# Patient Record
Sex: MALE | Race: WHITE | NOT HISPANIC OR LATINO | Employment: OTHER | URBAN - NONMETROPOLITAN AREA
[De-identification: names, ages, dates, MRNs, and addresses within clinical notes are randomized per-mention and may not be internally consistent; named-entity substitution may affect disease eponyms.]

---

## 2017-07-05 DIAGNOSIS — I11.9 BENIGN HYPERTENSIVE HEART DISEASE WITHOUT HEART FAILURE: ICD-10-CM

## 2017-07-05 DIAGNOSIS — E78.2 MIXED HYPERLIPIDEMIA: ICD-10-CM

## 2017-07-05 DIAGNOSIS — I10 ESSENTIAL HYPERTENSION, BENIGN: ICD-10-CM

## 2017-07-10 ENCOUNTER — HOSPITAL ENCOUNTER (OUTPATIENT)
Dept: LAB | Facility: MEDICAL CENTER | Age: 67
End: 2017-07-10
Attending: INTERNAL MEDICINE
Payer: MEDICARE

## 2017-07-10 ENCOUNTER — OFFICE VISIT (OUTPATIENT)
Dept: CARDIOLOGY | Facility: PHYSICIAN GROUP | Age: 67
End: 2017-07-10
Payer: MEDICARE

## 2017-07-10 VITALS
SYSTOLIC BLOOD PRESSURE: 112 MMHG | BODY MASS INDEX: 32.93 KG/M2 | DIASTOLIC BLOOD PRESSURE: 80 MMHG | OXYGEN SATURATION: 96 % | HEART RATE: 69 BPM | HEIGHT: 70 IN | WEIGHT: 230 LBS

## 2017-07-10 DIAGNOSIS — E78.2 MIXED HYPERLIPIDEMIA: ICD-10-CM

## 2017-07-10 DIAGNOSIS — I11.9 BENIGN HYPERTENSIVE HEART DISEASE WITHOUT HEART FAILURE: ICD-10-CM

## 2017-07-10 DIAGNOSIS — Z95.5 PRESENCE OF STENT IN LAD CORONARY ARTERY: ICD-10-CM

## 2017-07-10 DIAGNOSIS — I25.10 ATHEROSCLEROSIS OF NATIVE CORONARY ARTERY OF NATIVE HEART WITHOUT ANGINA PECTORIS: ICD-10-CM

## 2017-07-10 DIAGNOSIS — I10 ESSENTIAL HYPERTENSION, BENIGN: ICD-10-CM

## 2017-07-10 DIAGNOSIS — I25.2 OLD MYOCARDIAL INFARCTION: ICD-10-CM

## 2017-07-10 DIAGNOSIS — I50.42 CHRONIC COMBINED SYSTOLIC AND DIASTOLIC HEART FAILURE (HCC): ICD-10-CM

## 2017-07-10 LAB
ALBUMIN SERPL BCP-MCNC: 4.2 G/DL (ref 3.2–4.9)
ALBUMIN/GLOB SERPL: 1.6 G/DL
ALP SERPL-CCNC: 55 U/L (ref 30–99)
ALT SERPL-CCNC: 28 U/L (ref 2–50)
ANION GAP SERPL CALC-SCNC: 8 MMOL/L (ref 0–11.9)
AST SERPL-CCNC: 23 U/L (ref 12–45)
BASOPHILS # BLD AUTO: 0.7 % (ref 0–1.8)
BASOPHILS # BLD: 0.04 K/UL (ref 0–0.12)
BILIRUB SERPL-MCNC: 1.7 MG/DL (ref 0.1–1.5)
BUN SERPL-MCNC: 16 MG/DL (ref 8–22)
CALCIUM SERPL-MCNC: 9.5 MG/DL (ref 8.5–10.5)
CHLORIDE SERPL-SCNC: 104 MMOL/L (ref 96–112)
CHOLEST SERPL-MCNC: 123 MG/DL (ref 100–199)
CO2 SERPL-SCNC: 28 MMOL/L (ref 20–33)
CREAT SERPL-MCNC: 1.01 MG/DL (ref 0.5–1.4)
EOSINOPHIL # BLD AUTO: 0.08 K/UL (ref 0–0.51)
EOSINOPHIL NFR BLD: 1.4 % (ref 0–6.9)
ERYTHROCYTE [DISTWIDTH] IN BLOOD BY AUTOMATED COUNT: 45.6 FL (ref 35.9–50)
GFR SERPL CREATININE-BSD FRML MDRD: >60 ML/MIN/1.73 M 2
GLOBULIN SER CALC-MCNC: 2.7 G/DL (ref 1.9–3.5)
GLUCOSE SERPL-MCNC: 87 MG/DL (ref 65–99)
HCT VFR BLD AUTO: 43.3 % (ref 42–52)
HDLC SERPL-MCNC: 38 MG/DL
HGB BLD-MCNC: 14.7 G/DL (ref 14–18)
IMM GRANULOCYTES # BLD AUTO: 0.01 K/UL (ref 0–0.11)
IMM GRANULOCYTES NFR BLD AUTO: 0.2 % (ref 0–0.9)
LDLC SERPL CALC-MCNC: 68 MG/DL
LYMPHOCYTES # BLD AUTO: 1.63 K/UL (ref 1–4.8)
LYMPHOCYTES NFR BLD: 29.3 % (ref 22–41)
MCH RBC QN AUTO: 33 PG (ref 27–33)
MCHC RBC AUTO-ENTMCNC: 33.9 G/DL (ref 33.7–35.3)
MCV RBC AUTO: 97.3 FL (ref 81.4–97.8)
MONOCYTES # BLD AUTO: 0.5 K/UL (ref 0–0.85)
MONOCYTES NFR BLD AUTO: 9 % (ref 0–13.4)
NEUTROPHILS # BLD AUTO: 3.3 K/UL (ref 1.82–7.42)
NEUTROPHILS NFR BLD: 59.4 % (ref 44–72)
NRBC # BLD AUTO: 0 K/UL
NRBC BLD AUTO-RTO: 0 /100 WBC
PLATELET # BLD AUTO: 190 K/UL (ref 164–446)
PMV BLD AUTO: 11.6 FL (ref 9–12.9)
POTASSIUM SERPL-SCNC: 4.1 MMOL/L (ref 3.6–5.5)
PROT SERPL-MCNC: 6.9 G/DL (ref 6–8.2)
RBC # BLD AUTO: 4.45 M/UL (ref 4.7–6.1)
SODIUM SERPL-SCNC: 140 MMOL/L (ref 135–145)
TRIGL SERPL-MCNC: 87 MG/DL (ref 0–149)
WBC # BLD AUTO: 5.6 K/UL (ref 4.8–10.8)

## 2017-07-10 PROCEDURE — 85025 COMPLETE CBC W/AUTO DIFF WBC: CPT

## 2017-07-10 PROCEDURE — 36415 COLL VENOUS BLD VENIPUNCTURE: CPT

## 2017-07-10 PROCEDURE — 99213 OFFICE O/P EST LOW 20 MIN: CPT | Performed by: INTERNAL MEDICINE

## 2017-07-10 PROCEDURE — 80061 LIPID PANEL: CPT

## 2017-07-10 PROCEDURE — 80053 COMPREHEN METABOLIC PANEL: CPT

## 2017-07-10 ASSESSMENT — ENCOUNTER SYMPTOMS
MYALGIAS: 0
COUGH: 0
BRUISES/BLEEDS EASILY: 0
FALLS: 0
PND: 0
WHEEZING: 0
ORTHOPNEA: 0

## 2017-07-10 NOTE — PROGRESS NOTES
Subjective:   Levi Aguilar is a 66 y.o. male who presents today for follow-up of coronary artery disease.  Cardiac status has been clinically stable since last clinic visit.  No chest pain, palpitations, orthopnea, edema, syncope, or near-syncope.  Activity capacity has been stable.  No interval change otherwise.    Past Medical History   Diagnosis Date   • Coronary atherosclerosis of native coronary artery    • Prior anterior myocardial infarction      3/31/11, Lytic therapy in [place] and PCI at the [facility].    • Presence of stent in LAD coronary artery      3/31/11 - 2.75 x 15 mm Promus in LAD, [facility], post lytic therapy of anterior MI.    • Benign hypertensive heart disease without heart failure      ( CHF was due to ischemic heart disease)    • Mixed hyperlipidemia      Limited tolerance of statin therapy due to myalgias during primary prevention efforts prior to his infarct but now tolerant of full dose of atorvastatin     Past Surgical History   Procedure Laterality Date   • Tonsillectomy     • Cardiac cath     • Angioplasty       Family History   Problem Relation Age of Onset   • Heart Disease Mother    • Heart Disease Father      History   Smoking status   • Former Smoker -- 1.50 packs/day   • Quit date: 01/01/1980   Smokeless tobacco   • Former User   • Types: Chew     No Known Allergies  Outpatient Encounter Prescriptions as of 7/10/2017   Medication Sig Dispense Refill   • Cholecalciferol (VITAMIN D PO) Take  by mouth.     • nitroglycerin (NITROSTAT) 0.4 MG SL Tab Place 1 Tab under tongue as needed for Chest Pain. 100 Tab 2   • spironolactone/hctz (ALDACTAZIDE) 25-25 MG Tab Take 0.5 Tabs by mouth every day. 45 Tab 3   • metoprolol SR (TOPROL XL) 25 MG TABLET SR 24 HR Take 1 Tab by mouth every day. 90 Tab 3   • atorvastatin (LIPITOR) 80 MG tablet Take 1 Tab by mouth every bedtime. 90 Tab 3   • lisinopril (PRINIVIL) 10 MG Tab Take 1 Tab by mouth every day. 90 Tab 3   • lorazepam (ATIVAN) 0.5 MG TABS Take 1  "Tab by mouth as needed for Anxiety. 20 Tab 0   • aspirin (ASA) 325 MG TABS Take 162.5 mg by mouth every day.     • Multiple Vitamin (MULTI-VITAMIN PO) Take 1 Tab by mouth every day.       • MAGNESIUM CITRATE Take  by mouth every day.     • Omega-3 Fatty Acids (FISH OIL PO) Take  by mouth. Take 2 capsules daily.        No facility-administered encounter medications on file as of 7/10/2017.     Review of Systems   HENT: Negative for nosebleeds.    Respiratory: Negative for cough and wheezing.    Cardiovascular: Negative for orthopnea and PND.   Musculoskeletal: Negative for myalgias and falls.   Endo/Heme/Allergies: Does not bruise/bleed easily.        Objective:   /80 mmHg  Pulse 69  Ht 1.778 m (5' 10\")  Wt 104.327 kg (230 lb)  BMI 33.00 kg/m2  SpO2 96%    Physical Exam   Constitutional: He is oriented to person, place, and time. He appears well-developed and well-nourished. No distress.   Eyes: Conjunctivae are normal. No scleral icterus.   Neck: No JVD present.   Cardiovascular: Normal rate, regular rhythm, S1 normal, S2 normal and intact distal pulses.  Exam reveals no gallop.    No murmur heard.  No carotid bruits   Pulmonary/Chest: Effort normal and breath sounds normal.   Musculoskeletal: He exhibits no edema.   Neurological: He is alert and oriented to person, place, and time.   Skin: Skin is warm and dry. He is not diaphoretic.   Psychiatric: He has a normal mood and affect. Thought content normal.       Assessment:     1. Atherosclerosis of native coronary artery of native heart without angina pectoris     2. Prior anterior myocardial infarction     3. Presence of stent in LAD coronary artery     4. Mixed hyperlipidemia     5. Essential hypertension, benign     6. Chronic combined systolic and diastolic heart failure (CMS-[facility])     Blood pressure is well-controlled. Coronary disease is asymptomatic. He has no clinical evidence of heart failure. His ejection fraction improved on medical therapy " substantially although he will be due for an echocardiogram in the future within the next year. Lipids are due for reassessment today and he just had his lab work drawn.    Medical Decision Making:  Today's Assessment / Status / Plan:     Continue the current cardiovascular regimen.   Call for lab results which should be available by tomorrow.  Cardiology follow up in 4 months and  sooner if needed for any change.  Set up echo after that visit.   Use of the emergency medical system reviewed for any symptoms because he has been asymptomatic at his current activity level..

## 2017-09-23 ENCOUNTER — TELEPHONE (OUTPATIENT)
Dept: CARDIOLOGY | Facility: MEDICAL CENTER | Age: 67
End: 2017-09-23

## 2017-09-24 NOTE — TELEPHONE ENCOUNTER
Please make sure he knows his labs were good. His my chart code  and so I don't think he ever got the message after I saw him in July because he did the lab the same day. I left him a phone message. He usually calls me back. In any case the labs were good.

## 2017-09-25 NOTE — TELEPHONE ENCOUNTER
Pt returning your call   Received: Today   Message Contents   Brandon Phillips R.N.   Phone Number: [phone]             [facility]/Roseline     Pt is returning your call, can be reached at [phone] until 2.30 pm.      ======================================================================================    Spoke with pt, discussed lab results per JW, pt verbalizes understanding

## 2017-11-10 DIAGNOSIS — I50.42 CHRONIC COMBINED SYSTOLIC AND DIASTOLIC HEART FAILURE (HCC): ICD-10-CM

## 2017-11-10 DIAGNOSIS — E78.2 MIXED HYPERLIPIDEMIA: ICD-10-CM

## 2017-11-10 DIAGNOSIS — I10 ESSENTIAL HYPERTENSION, BENIGN: ICD-10-CM

## 2017-11-16 ENCOUNTER — TELEPHONE (OUTPATIENT)
Dept: CARDIOLOGY | Facility: MEDICAL CENTER | Age: 67
End: 2017-11-16

## 2017-11-16 NOTE — TELEPHONE ENCOUNTER
----- Message from Karlee Cannon sent at 11/16/2017  2:01 PM PST -----  Regarding: Patient needs lab order put into [employer] huap  [facility]/No    Patient has an appt tomorrow to have his lab work done and needs a lab order put into [employer]. He wants a call back after 3:30pm today at [phone] to confirm that the order is in the computer.

## 2017-11-17 ENCOUNTER — HOSPITAL ENCOUNTER (OUTPATIENT)
Dept: LAB | Facility: MEDICAL CENTER | Age: 67
End: 2017-11-17
Attending: INTERNAL MEDICINE
Payer: MEDICARE

## 2017-11-17 ENCOUNTER — OFFICE VISIT (OUTPATIENT)
Dept: CARDIOLOGY | Facility: PHYSICIAN GROUP | Age: 67
End: 2017-11-17
Payer: MEDICARE

## 2017-11-17 VITALS
WEIGHT: 226 LBS | OXYGEN SATURATION: 95 % | SYSTOLIC BLOOD PRESSURE: 120 MMHG | BODY MASS INDEX: 32.35 KG/M2 | HEART RATE: 65 BPM | DIASTOLIC BLOOD PRESSURE: 78 MMHG | HEIGHT: 70 IN

## 2017-11-17 DIAGNOSIS — I25.10 ATHEROSCLEROSIS OF NATIVE CORONARY ARTERY OF NATIVE HEART WITHOUT ANGINA PECTORIS: ICD-10-CM

## 2017-11-17 DIAGNOSIS — Z95.5 PRESENCE OF STENT IN LAD CORONARY ARTERY: ICD-10-CM

## 2017-11-17 DIAGNOSIS — I10 ESSENTIAL HYPERTENSION, BENIGN: ICD-10-CM

## 2017-11-17 DIAGNOSIS — I11.9 BENIGN HYPERTENSIVE HEART DISEASE WITHOUT HEART FAILURE: ICD-10-CM

## 2017-11-17 DIAGNOSIS — I50.42 CHRONIC COMBINED SYSTOLIC AND DIASTOLIC HEART FAILURE (HCC): ICD-10-CM

## 2017-11-17 DIAGNOSIS — E78.2 MIXED HYPERLIPIDEMIA: ICD-10-CM

## 2017-11-17 LAB
ALBUMIN SERPL BCP-MCNC: 3.6 G/DL (ref 3.2–4.9)
ALBUMIN/GLOB SERPL: 1 G/DL
ALP SERPL-CCNC: 65 U/L (ref 30–99)
ALT SERPL-CCNC: 28 U/L (ref 2–50)
ANION GAP SERPL CALC-SCNC: 10 MMOL/L (ref 0–11.9)
AST SERPL-CCNC: 25 U/L (ref 12–45)
BILIRUB SERPL-MCNC: 1.8 MG/DL (ref 0.1–1.5)
BUN SERPL-MCNC: 17 MG/DL (ref 8–22)
CALCIUM SERPL-MCNC: 10 MG/DL (ref 8.5–10.5)
CHLORIDE SERPL-SCNC: 102 MMOL/L (ref 96–112)
CHOLEST SERPL-MCNC: 135 MG/DL (ref 100–199)
CO2 SERPL-SCNC: 25 MMOL/L (ref 20–33)
CREAT SERPL-MCNC: 0.84 MG/DL (ref 0.5–1.4)
ERYTHROCYTE [DISTWIDTH] IN BLOOD BY AUTOMATED COUNT: 47.7 FL (ref 35.9–50)
GFR SERPL CREATININE-BSD FRML MDRD: >60 ML/MIN/1.73 M 2
GLOBULIN SER CALC-MCNC: 3.6 G/DL (ref 1.9–3.5)
GLUCOSE SERPL-MCNC: 87 MG/DL (ref 65–99)
HCT VFR BLD AUTO: 46.5 % (ref 42–52)
HDLC SERPL-MCNC: 46 MG/DL
HGB BLD-MCNC: 15.9 G/DL (ref 14–18)
LDLC SERPL CALC-MCNC: 71 MG/DL
MCH RBC QN AUTO: 33.1 PG (ref 27–33)
MCHC RBC AUTO-ENTMCNC: 34.2 G/DL (ref 33.7–35.3)
MCV RBC AUTO: 96.9 FL (ref 81.4–97.8)
PLATELET # BLD AUTO: 190 K/UL (ref 164–446)
PMV BLD AUTO: 11.6 FL (ref 9–12.9)
POTASSIUM SERPL-SCNC: 4.4 MMOL/L (ref 3.6–5.5)
PROT SERPL-MCNC: 7.2 G/DL (ref 6–8.2)
RBC # BLD AUTO: 4.8 M/UL (ref 4.7–6.1)
SODIUM SERPL-SCNC: 137 MMOL/L (ref 135–145)
TRIGL SERPL-MCNC: 89 MG/DL (ref 0–149)
WBC # BLD AUTO: 6.3 K/UL (ref 4.8–10.8)

## 2017-11-17 PROCEDURE — 85027 COMPLETE CBC AUTOMATED: CPT

## 2017-11-17 PROCEDURE — 36415 COLL VENOUS BLD VENIPUNCTURE: CPT

## 2017-11-17 PROCEDURE — 99213 OFFICE O/P EST LOW 20 MIN: CPT | Performed by: INTERNAL MEDICINE

## 2017-11-17 PROCEDURE — 80053 COMPREHEN METABOLIC PANEL: CPT

## 2017-11-17 PROCEDURE — 80061 LIPID PANEL: CPT

## 2017-11-17 ASSESSMENT — ENCOUNTER SYMPTOMS
ORTHOPNEA: 0
COUGH: 0
MYALGIAS: 0
WHEEZING: 0
PND: 0

## 2017-11-17 NOTE — PROGRESS NOTES
Subjective:   Levi Aguilar is a 67 y.o. male who presents today for f/u of CAD.  Cardiac status has been clinically stable since last clinic visit.  No chest pain, palpitations, orthopnea, edema, syncope, or near-syncope.  Exertional capacity has been stable.  No interval change otherwise.    Past Medical History:   Diagnosis Date   • Benign hypertensive heart disease without heart failure     ( CHF was due to ischemic heart disease)    • Coronary atherosclerosis of native coronary artery    • Mixed hyperlipidemia     Limited tolerance of statin therapy due to myalgias during primary prevention efforts prior to his infarct but now tolerant of full dose of atorvastatin   • Presence of stent in LAD coronary artery     3/31/11 - 2.75 x 15 mm Promus in [facility], post lytic therapy of anterior MI.    • Prior anterior myocardial infarction     3/31/11, Lytic therapy in [place] and PCI at the [facility].      Past Surgical History:   Procedure Laterality Date   • ANGIOPLASTY     • CARDIAC CATH     • TONSILLECTOMY       Family History   Problem Relation Age of Onset   • Heart Disease Mother    • Heart Disease Father      History   Smoking Status   • Former Smoker   • Packs/day: 1.50   • Quit date: 1/1/1980   Smokeless Tobacco   • Former User   • Types: Chew     Not on File  Outpatient Encounter Prescriptions as of 11/17/2017   Medication Sig Dispense Refill   • Cholecalciferol (VITAMIN D PO) Take  by mouth.     • spironolactone/hctz (ALDACTAZIDE) 25-25 MG Tab Take 0.5 Tabs by mouth every day. 45 Tab 3   • metoprolol SR (TOPROL XL) 25 MG TABLET SR 24 HR Take 1 Tab by mouth every day. 90 Tab 3   • atorvastatin (LIPITOR) 80 MG tablet Take 1 Tab by mouth every bedtime. 90 Tab 3   • lisinopril (PRINIVIL) 10 MG Tab Take 1 Tab by mouth every day. 90 Tab 3   • aspirin (ASA) 325 MG TABS Take 162.5 mg by mouth every day.     • Multiple Vitamin (MULTI-VITAMIN PO) Take 1 Tab by mouth every day.       • MAGNESIUM CITRATE Take  by mouth  "every day.     • Omega-3 Fatty Acids (FISH OIL PO) Take  by mouth. Take 2 capsules daily.      • nitroglycerin (NITROSTAT) 0.4 MG SL Tab Place 1 Tab under tongue as needed for Chest Pain. 100 Tab 2   • lorazepam (ATIVAN) 0.5 MG TABS Take 1 Tab by mouth as needed for Anxiety. 20 Tab 0     No facility-administered encounter medications on file as of 11/17/2017.      Review of Systems   HENT: Negative for nosebleeds.    Respiratory: Negative for cough and wheezing.    Cardiovascular: Negative for orthopnea and PND.   Musculoskeletal: Negative for myalgias (So far none of the myalgias which precluded other statin therapy in the past. ).        Objective:   /78   Pulse 65   Ht 1.778 m (5' 10\")   Wt 102.5 kg (226 lb)   SpO2 95%   BMI 32.43 kg/m²     Physical Exam   Constitutional: He is oriented to person, place, and time. He appears well-developed and well-nourished. No distress.   Eyes: Conjunctivae are normal. No scleral icterus.   Neck: No JVD present.   No carotid bruits   Cardiovascular: Normal rate, regular rhythm, S1 normal, S2 normal and intact distal pulses.  Exam reveals no gallop.    No murmur heard.  Pulses:       Carotid pulses are 2+ on the right side, and 2+ on the left side.  Pulmonary/Chest: Effort normal and breath sounds normal.   Musculoskeletal: He exhibits no edema.   Neurological: He is alert and oriented to person, place, and time.   Skin: Skin is warm and dry. He is not diaphoretic.   Psychiatric: He has a normal mood and affect. Thought content normal.       Assessment:     1. Atherosclerosis of native coronary artery of native heart without angina pectoris     2. Presence of stent in LAD coronary artery     3. Benign hypertensive heart disease without heart failure     4. Mixed hyperlipidemia       He has never had heart failure as a result of hypertensive cardiovascular disease although he did have systolic and diastolic heart failure acutely when his coronary disease was unstable " but no clinical recurrence of that. Blood pressure control has been good. He did his lab today and we should have the results by tomorrow. I will notify him if they are abnormal. Coronary disease is clinically stable and asymptomatic.  Medical Decision Making:  Today's Assessment / Status / Plan:   Continue the current cardiovascular regimen.   Cardiology follow up in 6 months and  sooner if needed for any change.   Lab before next visit.  Use of the emergency medical system reviewed.   Addendum: His lab were drawn on the day of the visit and were satisfactory.

## 2017-11-20 ENCOUNTER — TELEPHONE (OUTPATIENT)
Dept: CARDIOLOGY | Facility: MEDICAL CENTER | Age: 67
End: 2017-11-20

## 2017-11-20 NOTE — TELEPHONE ENCOUNTER
----- Message from Eduardo Funk M.D. sent at 11/19/2017  5:11 PM PST -----  Please let him know his lab looked good.

## 2017-12-15 DIAGNOSIS — I10 ESSENTIAL HYPERTENSION: ICD-10-CM

## 2017-12-15 DIAGNOSIS — E78.2 MIXED HYPERLIPIDEMIA: ICD-10-CM

## 2017-12-15 DIAGNOSIS — I25.119 ATHEROSCLEROSIS OF NATIVE CORONARY ARTERY WITH ANGINA PECTORIS, UNSPECIFIED WHETHER NATIVE OR TRANSPLANTED HEART (HCC): ICD-10-CM

## 2017-12-15 RX ORDER — ATORVASTATIN CALCIUM 80 MG/1
80 TABLET, FILM COATED ORAL
Qty: 90 TAB | Refills: 3 | Status: CANCELLED | OUTPATIENT
Start: 2017-12-15

## 2017-12-15 RX ORDER — SPIRONOLACTONE AND HYDROCHLOROTHIAZIDE 25; 25 MG/1; MG/1
0.5 TABLET ORAL
Qty: 45 TAB | Refills: 3 | Status: CANCELLED | OUTPATIENT
Start: 2017-12-15

## 2017-12-15 RX ORDER — NITROGLYCERIN 0.4 MG/1
0.4 TABLET SUBLINGUAL PRN
Qty: 100 TAB | Refills: 3 | Status: CANCELLED | OUTPATIENT
Start: 2017-12-15

## 2017-12-15 RX ORDER — LISINOPRIL 10 MG/1
10 TABLET ORAL
Qty: 90 TAB | Refills: 3 | Status: CANCELLED | OUTPATIENT
Start: 2017-12-15

## 2017-12-15 RX ORDER — METOPROLOL SUCCINATE 25 MG/1
25 TABLET, EXTENDED RELEASE ORAL
Qty: 90 TAB | Refills: 3 | Status: CANCELLED | OUTPATIENT
Start: 2017-12-15

## 2017-12-18 DIAGNOSIS — E78.2 MIXED HYPERLIPIDEMIA: ICD-10-CM

## 2017-12-18 DIAGNOSIS — I25.10 ATHEROSCLEROSIS OF NATIVE CORONARY ARTERY OF NATIVE HEART WITHOUT ANGINA PECTORIS: ICD-10-CM

## 2017-12-18 DIAGNOSIS — I10 ESSENTIAL HYPERTENSION: ICD-10-CM

## 2017-12-18 RX ORDER — SPIRONOLACTONE AND HYDROCHLOROTHIAZIDE 25; 25 MG/1; MG/1
0.5 TABLET ORAL
Qty: 45 TAB | Refills: 3 | Status: SHIPPED | OUTPATIENT
Start: 2017-12-18 | End: 2018-08-06

## 2017-12-18 RX ORDER — LISINOPRIL 10 MG/1
10 TABLET ORAL
Qty: 90 TAB | Refills: 3 | Status: SHIPPED | OUTPATIENT
Start: 2017-12-18 | End: 2018-08-06

## 2017-12-18 RX ORDER — METOPROLOL SUCCINATE 25 MG/1
25 TABLET, EXTENDED RELEASE ORAL
Qty: 90 TAB | Refills: 3 | Status: SHIPPED | OUTPATIENT
Start: 2017-12-18 | End: 2018-08-06

## 2017-12-18 RX ORDER — ATORVASTATIN CALCIUM 80 MG/1
80 TABLET, FILM COATED ORAL
Qty: 90 TAB | Refills: 3 | Status: SHIPPED | OUTPATIENT
Start: 2017-12-18 | End: 2018-08-06

## 2017-12-18 RX ORDER — NITROGLYCERIN 0.4 MG/1
0.4 TABLET SUBLINGUAL PRN
Qty: 25 TAB | Refills: 6 | Status: SHIPPED | OUTPATIENT
Start: 2017-12-18 | End: 2022-09-22

## 2018-07-23 ENCOUNTER — TELEPHONE (OUTPATIENT)
Dept: CARDIOLOGY | Facility: MEDICAL CENTER | Age: 68
End: 2018-07-23

## 2018-07-23 DIAGNOSIS — I25.10 ATHEROSCLEROSIS OF NATIVE CORONARY ARTERY OF NATIVE HEART WITHOUT ANGINA PECTORIS: ICD-10-CM

## 2018-07-23 DIAGNOSIS — E78.2 MIXED HYPERLIPIDEMIA: ICD-10-CM

## 2018-07-23 DIAGNOSIS — I50.42 CHRONIC COMBINED SYSTOLIC AND DIASTOLIC HEART FAILURE (HCC): ICD-10-CM

## 2018-07-23 NOTE — TELEPHONE ENCOUNTER
----- Message from Brandon Jung sent at 7/23/2018  3:24 PM PDT -----  Regarding: Requesting lab slip be mailed   Contact: [phone]  MC/Roseline Stephen pt of [facility] is requesting lab slip be mailed in preparation for upcoming appt with MC next month. If question's can be reached at [phone].    Please mail to:  [id number]    [facility] [id number]    ================================================================    CMP and Lipid Profile ordered, lab slip mailed to pt

## 2018-08-06 ENCOUNTER — HOSPITAL ENCOUNTER (OUTPATIENT)
Dept: LAB | Facility: MEDICAL CENTER | Age: 68
End: 2018-08-06
Attending: INTERNAL MEDICINE
Payer: MEDICARE

## 2018-08-06 ENCOUNTER — OFFICE VISIT (OUTPATIENT)
Dept: CARDIOLOGY | Facility: PHYSICIAN GROUP | Age: 68
End: 2018-08-06
Payer: MEDICARE

## 2018-08-06 VITALS
HEART RATE: 74 BPM | OXYGEN SATURATION: 96 % | DIASTOLIC BLOOD PRESSURE: 80 MMHG | BODY MASS INDEX: 31.5 KG/M2 | SYSTOLIC BLOOD PRESSURE: 122 MMHG | WEIGHT: 220 LBS | HEIGHT: 70 IN

## 2018-08-06 DIAGNOSIS — I50.42 CHRONIC COMBINED SYSTOLIC AND DIASTOLIC HEART FAILURE (HCC): ICD-10-CM

## 2018-08-06 DIAGNOSIS — I25.10 CORONARY ARTERY DISEASE, ANGINA PRESENCE UNSPECIFIED, UNSPECIFIED VESSEL OR LESION TYPE, UNSPECIFIED WHETHER NATIVE OR TRANSPLANTED HEART: ICD-10-CM

## 2018-08-06 DIAGNOSIS — I10 ESSENTIAL HYPERTENSION: ICD-10-CM

## 2018-08-06 DIAGNOSIS — I25.10 ATHEROSCLEROSIS OF NATIVE CORONARY ARTERY OF NATIVE HEART WITHOUT ANGINA PECTORIS: ICD-10-CM

## 2018-08-06 DIAGNOSIS — E78.2 MIXED HYPERLIPIDEMIA: ICD-10-CM

## 2018-08-06 LAB
ALBUMIN SERPL BCP-MCNC: 4.7 G/DL (ref 3.2–4.9)
ALBUMIN/GLOB SERPL: 1.9 G/DL
ALP SERPL-CCNC: 66 U/L (ref 30–99)
ALT SERPL-CCNC: 28 U/L (ref 2–50)
ANION GAP SERPL CALC-SCNC: 8 MMOL/L (ref 0–11.9)
AST SERPL-CCNC: 23 U/L (ref 12–45)
BILIRUB SERPL-MCNC: 1.9 MG/DL (ref 0.1–1.5)
BUN SERPL-MCNC: 13 MG/DL (ref 8–22)
CALCIUM SERPL-MCNC: 9.8 MG/DL (ref 8.5–10.5)
CHLORIDE SERPL-SCNC: 103 MMOL/L (ref 96–112)
CHOLEST SERPL-MCNC: 125 MG/DL (ref 100–199)
CO2 SERPL-SCNC: 26 MMOL/L (ref 20–33)
CREAT SERPL-MCNC: 0.95 MG/DL (ref 0.5–1.4)
GLOBULIN SER CALC-MCNC: 2.5 G/DL (ref 1.9–3.5)
GLUCOSE SERPL-MCNC: 94 MG/DL (ref 65–99)
HDLC SERPL-MCNC: 38 MG/DL
LDLC SERPL CALC-MCNC: 72 MG/DL
POTASSIUM SERPL-SCNC: 4.3 MMOL/L (ref 3.6–5.5)
PROT SERPL-MCNC: 7.2 G/DL (ref 6–8.2)
SODIUM SERPL-SCNC: 137 MMOL/L (ref 135–145)
TRIGL SERPL-MCNC: 76 MG/DL (ref 0–149)

## 2018-08-06 PROCEDURE — 80053 COMPREHEN METABOLIC PANEL: CPT

## 2018-08-06 PROCEDURE — 99214 OFFICE O/P EST MOD 30 MIN: CPT | Performed by: INTERNAL MEDICINE

## 2018-08-06 PROCEDURE — 80061 LIPID PANEL: CPT

## 2018-08-06 PROCEDURE — 36415 COLL VENOUS BLD VENIPUNCTURE: CPT

## 2018-08-06 RX ORDER — METOPROLOL SUCCINATE 25 MG/1
25 TABLET, EXTENDED RELEASE ORAL
Qty: 90 TAB | Refills: 3 | Status: SHIPPED | OUTPATIENT
Start: 2018-08-06 | End: 2019-09-16 | Stop reason: SDUPTHER

## 2018-08-06 RX ORDER — SPIRONOLACTONE AND HYDROCHLOROTHIAZIDE 25; 25 MG/1; MG/1
0.5 TABLET ORAL
Qty: 45 TAB | Refills: 3 | Status: SHIPPED | OUTPATIENT
Start: 2018-08-06 | End: 2019-09-16 | Stop reason: SDUPTHER

## 2018-08-06 RX ORDER — LISINOPRIL 10 MG/1
10 TABLET ORAL
Qty: 90 TAB | Refills: 3 | Status: SHIPPED | OUTPATIENT
Start: 2018-08-06 | End: 2019-09-16 | Stop reason: SDUPTHER

## 2018-08-06 RX ORDER — ATORVASTATIN CALCIUM 80 MG/1
80 TABLET, FILM COATED ORAL
Qty: 90 TAB | Refills: 3 | Status: SHIPPED | OUTPATIENT
Start: 2018-08-06 | End: 2019-09-16 | Stop reason: SDUPTHER

## 2018-08-06 NOTE — PROGRESS NOTES
Cardiology Follow-up Consultation Note    Date of note:    8/6/2018    Primary Care Provider: Pcp Pt States None  Referring Provider: Eduardo Funk M.D.     Patient Name: Levi Aguilar     YOB: 1950  MRN:              [id number]    Chief Complaint: CAD    History of Present Illness: Levi Aguilar is a 67 y.o. male whose current medical problems include hypertension, dyslipidemia, CAD sp PCI after lytic therapy in 2011 who is here for follow-up.    Last seen by Dr. Funk on 11/17/2017.    Interim Events:    In terms of CAD, no angina.     Can walk several miles without stopping.    In terms of hypertension, BP at home 110s/70s. Well controlled.    In terms of dyslipidemia, labs pending from today. Last LDL almost at goal.     ROS  Constitutional: Negative for chills, fever, weakness, night sweats, weight gain and weight loss.   Eyes: Negative for double vision, vision loss in left eye and vision loss in right eye.   Cardiovascular: see HPI.  Respiratory: Negative for cough, shortness of breath, and wheezing.    Musculoskeletal: Negative for joint swelling, muscle cramps, muscle weakness and myalgias.   Gastrointestinal: Negative for abdominal pain, hematochezia, hemorrhoids and melena.   Genitourinary: Negative for frequency and hematuria.   Neurological: Negative for dizziness, focal weakness, light-headedness, numbness, paresthesias.      Past Medical History:   Diagnosis Date   • Benign hypertensive heart disease without heart failure     ( CHF was due to ischemic heart disease)    • Coronary atherosclerosis of native coronary artery    • Mixed hyperlipidemia     Limited tolerance of statin therapy due to myalgias during primary prevention efforts prior to his infarct but now tolerant of full dose of atorvastatin   • Presence of stent in LAD coronary artery     3/31/11 - 2.75 x 15 mm Promus in LAD, [facility], post lytic therapy of anterior MI.    • Prior anterior myocardial  "infarction     3/31/11, Lytic therapy in [place] and PCI at the [facility].          Past Surgical History:   Procedure Laterality Date   • ANGIOPLASTY     • CARDIAC CATH     • TONSILLECTOMY           Current Outpatient Prescriptions   Medication Sig Dispense Refill   • nitroglycerin (NITROSTAT) 0.4 MG SL Tab Place 1 Tab under tongue as needed for Chest Pain. 25 Tab 6   • spironolactone/hctz (ALDACTAZIDE) 25-25 MG Tab Take 0.5 Tabs by mouth every day. 45 Tab 3   • metoprolol SR (TOPROL XL) 25 MG TABLET SR 24 HR Take 1 Tab by mouth every day. 90 Tab 3   • atorvastatin (LIPITOR) 80 MG tablet Take 1 Tab by mouth every bedtime. 90 Tab 3   • lisinopril (PRINIVIL) 10 MG Tab Take 1 Tab by mouth every day. 90 Tab 3   • Cholecalciferol (VITAMIN D PO) Take  by mouth.     • lorazepam (ATIVAN) 0.5 MG TABS Take 1 Tab by mouth as needed for Anxiety. 20 Tab 0   • aspirin (ASA) 325 MG TABS Take 162.5 mg by mouth every day.     • MAGNESIUM CITRATE Take  by mouth every day.     • Omega-3 Fatty Acids (FISH OIL PO) Take  by mouth. Take 2 capsules daily.      • Multiple Vitamin (MULTI-VITAMIN PO) Take 1 Tab by mouth every day.         No current facility-administered medications for this visit.          No Known Allergies      Family History   Problem Relation Age of Onset   • Heart Disease Mother    • Heart Disease Father          Social History     Social History   • Marital status:      Spouse name: N/A   • Number of children: N/A   • Years of education: N/A     Occupational History   • Not on file.     Social History Main Topics   • Smoking status: Former Smoker     Packs/day: 1.50     Quit date: 1/1/1980   • Smokeless tobacco: Current User     Types: Chew   • Alcohol use Yes   • Drug use: No   • Sexual activity: Not on file     Other Topics Concern   • Not on file     Social History Narrative   • No narrative on file         Physical Exam:  Ambulatory Vitals  Blood pressure 122/80, pulse 74, height 1.778 m (5' 10\"), weight 99.8 kg " (220 lb), SpO2 96 %.   Oxygen Therapy:  Pulse Oximetry: 96 %  BP Readings from Last 4 Encounters:   08/06/18 122/80   11/17/17 120/78   07/10/17 112/80   11/15/16 130/92       Weight/BMI: Body mass index is 31.57 kg/m².  Wt Readings from Last 4 Encounters:   08/06/18 99.8 kg (220 lb)   11/17/17 102.5 kg (226 lb)   07/10/17 104.3 kg (230 lb)   11/15/16 105.7 kg (233 lb)       General: Well appearing and in no apparent distress  Eyes: nl conjunctiva  ENT: OP clear, normal external appearance of nose and ears  Neck: JVP 4 cm H2O, no carotid bruits  Lungs: normal respiratory effort, CTAB  Heart: RRR, no murmurs, no rubs or gallops, no edema bilateral lower extremities. No LV/RV heave on cardiac palpatation. 2+ bilateral radial pulses.  2+ bilateral dp pulses.   Abdomen: soft, non tender, non distended, no masses, normal bowel sounds.  No HSM.  Extremities/MSK: no clubbing, no cyanosis  Neurological: No focal sensory deficits  Psychiatric: Appropriate affect, A/O x 3, intact judgement and insight  Skin: Warm extremities      Lab Data Review:  Lab Results   Component Value Date/Time    CHOLSTRLTOT 135 11/17/2017 10:49 AM    LDL 71 11/17/2017 10:49 AM    HDL 46 11/17/2017 10:49 AM    TRIGLYCERIDE 89 11/17/2017 10:49 AM       Lab Results   Component Value Date/Time    SODIUM 137 11/17/2017 10:49 AM    POTASSIUM 4.4 11/17/2017 10:49 AM    CHLORIDE 102 11/17/2017 10:49 AM    CO2 25 11/17/2017 10:49 AM    GLUCOSE 87 11/17/2017 10:49 AM    BUN 17 11/17/2017 10:49 AM    CREATININE 0.84 11/17/2017 10:49 AM     Lab Results   Component Value Date/Time    ALKPHOSPHAT 65 11/17/2017 10:49 AM    ASTSGOT 25 11/17/2017 10:49 AM    ALTSGPT 28 11/17/2017 10:49 AM    TBILIRUBIN 1.8 (H) 11/17/2017 10:49 AM      Lab Results   Component Value Date/Time    WBC 6.3 11/17/2017 10:49 AM     No components found for: HBGA1C  No components found for: TROPONIN  No components found for: BNP      Cardiac Imaging and Procedures Review:    EKG dated  2018 : My personal interpretation is NSR, RBBB, LAFB, anterior q waves     Echo dated :   FINDINGS  Left Ventricle  Top normal left ventricular chamber size.  Moderately reduced left   ventricular systolic function.  Ejection fraction  45-50%.  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  Grade I diastolic dysfunction is present.    Right Ventricle  Normal right ventricular size.     Right Atrium  Normal right atrial size.     Left Atrium  Normal left atrial size.     Mitral Valve   Mitral valve opens normally. Trace mitral regurgitation.    Aortic Valve  Aortic valve opens normally.    Tricuspid Valve  Structurally normal tricuspid valve. Tricuspid valve opens normally.   Mild tricuspid regurgitation. Right ventricular systolic pressure is   estimated to be 32 mmhg..    Pulmonic Valve  The pulmonic valve is not well visualized.    Pericardium  No pericardial effusion seen.     Aorta  Normal aortic root for body surface area.     CONCLUSIONS  Moderately reduced left ventricular systolic function.  Ejection fraction  45-50%.  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  Grade I diastolic dysfunction is present.  Normal left atrial size.        Medical Decision Makin. Coronary artery disease, angina presence unspecified, unspecified vessel or lesion type, unspecified whether native or transplanted heart  Asymptomatic  -continue aspirin, decrease to 81mg PO Daily.  -continue statin, f/u LDL      2. Mixed hyperlipidemia  lipitor    3. Essential hypertension  Well controlled. F/u labs drawn today.     4. Ischemic Cardiomyopathy  Continue ACE/BB      Return in about 1 year (around 2019).      Yonny Bernabe MD, [facility]  [facility], [address]  [address] [address]  Phone: [phone]  Fax: [phone]

## 2019-09-16 DIAGNOSIS — E78.2 MIXED HYPERLIPIDEMIA: ICD-10-CM

## 2019-09-16 DIAGNOSIS — I10 ESSENTIAL HYPERTENSION: ICD-10-CM

## 2019-09-16 RX ORDER — METOPROLOL SUCCINATE 25 MG/1
25 TABLET, EXTENDED RELEASE ORAL
Qty: 90 TAB | Refills: 1 | Status: SHIPPED | OUTPATIENT
Start: 2019-09-16 | End: 2020-03-02 | Stop reason: SDUPTHER

## 2019-09-16 RX ORDER — LISINOPRIL 10 MG/1
10 TABLET ORAL
Qty: 90 TAB | Refills: 1 | Status: SHIPPED | OUTPATIENT
Start: 2019-09-16 | End: 2020-03-02 | Stop reason: SDUPTHER

## 2019-09-16 RX ORDER — ATORVASTATIN CALCIUM 80 MG/1
80 TABLET, FILM COATED ORAL
Qty: 90 TAB | Refills: 1 | Status: SHIPPED | OUTPATIENT
Start: 2019-09-16 | End: 2020-03-02 | Stop reason: SDUPTHER

## 2019-09-16 RX ORDER — SPIRONOLACTONE AND HYDROCHLOROTHIAZIDE 25; 25 MG/1; MG/1
0.5 TABLET ORAL
Qty: 45 TAB | Refills: 1 | Status: SHIPPED | OUTPATIENT
Start: 2019-09-16 | End: 2020-03-02 | Stop reason: SDUPTHER

## 2019-09-20 ENCOUNTER — TELEPHONE (OUTPATIENT)
Dept: CARDIOLOGY | Facility: MEDICAL CENTER | Age: 69
End: 2019-09-20

## 2019-09-20 DIAGNOSIS — E78.2 MIXED HYPERLIPIDEMIA: ICD-10-CM

## 2019-09-20 DIAGNOSIS — I10 ESSENTIAL HYPERTENSION: ICD-10-CM

## 2019-09-20 DIAGNOSIS — I25.10 CORONARY ARTERY DISEASE, ANGINA PRESENCE UNSPECIFIED, UNSPECIFIED VESSEL OR LESION TYPE, UNSPECIFIED WHETHER NATIVE OR TRANSPLANTED HEART: ICD-10-CM

## 2019-09-20 NOTE — TELEPHONE ENCOUNTER
MC    This patient would like to have his lab papers available in the [place] office for his appointment that he has on 9/25/19.     Thank you,  Carisa GANDARA

## 2019-09-20 NOTE — TELEPHONE ENCOUNTER
Called pt, pt is just requesting for a blood test order to be placed on his chart so he can have it done prior to his appt with Dr Bernabe on 9/25/19, he will go to [facility].    CMP and Lipid profile ordered.

## 2019-09-25 ENCOUNTER — OFFICE VISIT (OUTPATIENT)
Dept: CARDIOLOGY | Facility: PHYSICIAN GROUP | Age: 69
End: 2019-09-25
Payer: MEDICARE

## 2019-09-25 ENCOUNTER — HOSPITAL ENCOUNTER (OUTPATIENT)
Dept: LAB | Facility: MEDICAL CENTER | Age: 69
End: 2019-09-25
Attending: INTERNAL MEDICINE
Payer: MEDICARE

## 2019-09-25 VITALS
DIASTOLIC BLOOD PRESSURE: 74 MMHG | OXYGEN SATURATION: 95 % | WEIGHT: 216 LBS | SYSTOLIC BLOOD PRESSURE: 120 MMHG | HEART RATE: 84 BPM | HEIGHT: 70 IN | BODY MASS INDEX: 30.92 KG/M2

## 2019-09-25 DIAGNOSIS — I25.10 ATHEROSCLEROSIS OF NATIVE CORONARY ARTERY OF NATIVE HEART WITHOUT ANGINA PECTORIS: ICD-10-CM

## 2019-09-25 DIAGNOSIS — I25.2 OLD MYOCARDIAL INFARCTION: ICD-10-CM

## 2019-09-25 DIAGNOSIS — I25.10 CORONARY ARTERY DISEASE, ANGINA PRESENCE UNSPECIFIED, UNSPECIFIED VESSEL OR LESION TYPE, UNSPECIFIED WHETHER NATIVE OR TRANSPLANTED HEART: ICD-10-CM

## 2019-09-25 DIAGNOSIS — I10 ESSENTIAL HYPERTENSION, BENIGN: ICD-10-CM

## 2019-09-25 DIAGNOSIS — E78.2 MIXED HYPERLIPIDEMIA: ICD-10-CM

## 2019-09-25 DIAGNOSIS — I10 ESSENTIAL HYPERTENSION: ICD-10-CM

## 2019-09-25 LAB
ALBUMIN SERPL BCP-MCNC: 4.6 G/DL (ref 3.2–4.9)
ALBUMIN/GLOB SERPL: 2 G/DL
ALP SERPL-CCNC: 61 U/L (ref 30–99)
ALT SERPL-CCNC: 33 U/L (ref 2–50)
ANION GAP SERPL CALC-SCNC: 9 MMOL/L (ref 0–11.9)
AST SERPL-CCNC: 27 U/L (ref 12–45)
BILIRUB SERPL-MCNC: 2 MG/DL (ref 0.1–1.5)
BUN SERPL-MCNC: 17 MG/DL (ref 8–22)
CALCIUM SERPL-MCNC: 10 MG/DL (ref 8.5–10.5)
CHLORIDE SERPL-SCNC: 103 MMOL/L (ref 96–112)
CHOLEST SERPL-MCNC: 130 MG/DL (ref 100–199)
CO2 SERPL-SCNC: 26 MMOL/L (ref 20–33)
CREAT SERPL-MCNC: 1.04 MG/DL (ref 0.5–1.4)
FASTING STATUS PATIENT QL REPORTED: NORMAL
GLOBULIN SER CALC-MCNC: 2.3 G/DL (ref 1.9–3.5)
GLUCOSE SERPL-MCNC: 85 MG/DL (ref 65–99)
HDLC SERPL-MCNC: 48 MG/DL
LDLC SERPL CALC-MCNC: 65 MG/DL
POTASSIUM SERPL-SCNC: 4.4 MMOL/L (ref 3.6–5.5)
PROT SERPL-MCNC: 6.9 G/DL (ref 6–8.2)
SODIUM SERPL-SCNC: 138 MMOL/L (ref 135–145)
TRIGL SERPL-MCNC: 86 MG/DL (ref 0–149)

## 2019-09-25 PROCEDURE — 80061 LIPID PANEL: CPT

## 2019-09-25 PROCEDURE — 80053 COMPREHEN METABOLIC PANEL: CPT

## 2019-09-25 PROCEDURE — 36415 COLL VENOUS BLD VENIPUNCTURE: CPT

## 2019-09-25 PROCEDURE — 99213 OFFICE O/P EST LOW 20 MIN: CPT | Performed by: INTERNAL MEDICINE

## 2019-09-25 NOTE — PROGRESS NOTES
Cardiology Follow-up Consultation Note    Date of note:    9/25/2019  Primary Care Provider: Pcp Pt States None  Referring Provider: Eduardo Funk M.D.     Patient Name: Levi Aguilar     YOB: 1950  MRN:              [id number]    Chief Complaint: CAD    History of Present Illness: Levi Aguilar is a 68 y.o. male whose current medical problems include hypertension, dyslipidemia, CAD sp PCI after lytic therapy in 2011 who is here for follow-up.    At ou visit, 8/6/2018: no symptoms.       Interim Events:  In terms of CAD, no angina.     Can walk several miles without stopping.    In terms of hypertension, BP at home 110s/70s. Well controlled.    In terms of dyslipidemia, labs pending from today. Last LDL almost at goal.         ROS  Constitution: Negative for chills, fever and night sweats.   HENT: Negative for nosebleeds.    Eyes: Negative for vision loss in left eye and vision loss in right eye.   Respiratory: Negative for hemoptysis.    Gastrointestinal: Negative for hematemesis, hematochezia and melena.   Genitourinary: Negative for hematuria.   Neurological: Negative for focal weakness, numbness and paresthesias.     All other systems reviewed and discussed using a comprehensive questionnaire and are negative.         Past Medical History:   Diagnosis Date   • Coronary atherosclerosis of native coronary artery    • Hypertension    • Ischemic cardiomyopathy     EF 45%   • Mixed hyperlipidemia     Limited tolerance of statin therapy due to myalgias during primary prevention efforts prior to his infarct but now tolerant of full dose of atorvastatin   • Presence of stent in LAD coronary artery     3/31/11 - 2.75 x 15 mm Promus in LAD, [facility], post lytic therapy of anterior MI.    • Prior anterior myocardial infarction     3/31/11, Lytic therapy in [place] and PCI at the [facility].          Past Surgical History:   Procedure Laterality Date   • ANGIOPLASTY  2011    LAD stenting   • TONSILLECTOMY            Current Outpatient Medications   Medication Sig Dispense Refill   • atorvastatin (LIPITOR) 80 MG tablet Take 1 Tab by mouth every bedtime. 90 Tab 1   • metoprolol SR (TOPROL XL) 25 MG TABLET SR 24 HR Take 1 Tab by mouth every day. 90 Tab 1   • lisinopril (PRINIVIL) 10 MG Tab Take 1 Tab by mouth every day. 90 Tab 1   • spironolactone/hctz (ALDACTAZIDE) 25-25 MG Tab Take 0.5 Tabs by mouth every day. 45 Tab 1   • aspirin 81 MG tablet Take 1 Tab by mouth every day. 90 Tab 3   • nitroglycerin (NITROSTAT) 0.4 MG SL Tab Place 1 Tab under tongue as needed for Chest Pain. 25 Tab 6   • Cholecalciferol (VITAMIN D PO) Take  by mouth.     • MAGNESIUM CITRATE Take  by mouth every day.     • Omega-3 Fatty Acids (FISH OIL PO) Take  by mouth. Take 2 capsules daily.        No current facility-administered medications for this visit.          No Known Allergies      Family History   Problem Relation Age of Onset   • Heart Disease Mother    • Heart Disease Father          Social History     Socioeconomic History   • Marital status:      Spouse name: Not on file   • Number of children: Not on file   • Years of education: Not on file   • Highest education level: Not on file   Occupational History   • Not on file   Social Needs   • Financial resource strain: Not on file   • Food insecurity:     Worry: Not on file     Inability: Not on file   • Transportation needs:     Medical: Not on file     Non-medical: Not on file   Tobacco Use   • Smoking status: Former Smoker     Packs/day: 1.50     Last attempt to quit: 1980     Years since quittin.7   • Smokeless tobacco: Current User     Types: Chew   Substance and Sexual Activity   • Alcohol use: Yes     Alcohol/week: 8.4 oz     Types: 14 Standard drinks or equivalent per week   • Drug use: No   • Sexual activity: Not on file   Lifestyle   • Physical activity:     Days per week: Not on file     Minutes per session: Not on file   • Stress: Not on file   Relationships   •  "Social connections:     Talks on phone: Not on file     Gets together: Not on file     Attends Yarsani service: Not on file     Active member of club or organization: Not on file     Attends meetings of clubs or organizations: Not on file     Relationship status: Not on file   • Intimate partner violence:     Fear of current or ex partner: Not on file     Emotionally abused: Not on file     Physically abused: Not on file     Forced sexual activity: Not on file   Other Topics Concern   • Not on file   Social History Narrative    Retired,          Physical Exam:  Ambulatory Vitals  /74 (BP Location: Left arm, Patient Position: Sitting, BP Cuff Size: Adult)   Pulse 84   Ht 1.778 m (5' 10\")   Wt 98 kg (216 lb)   SpO2 95%    Oxygen Therapy:  Pulse Oximetry: 95 %  BP Readings from Last 4 Encounters:   09/25/19 120/74   08/06/18 122/80   11/17/17 120/78   07/10/17 112/80       Weight/BMI: Body mass index is 30.99 kg/m².  Wt Readings from Last 4 Encounters:   09/25/19 98 kg (216 lb)   08/06/18 99.8 kg (220 lb)   11/17/17 102.5 kg (226 lb)   07/10/17 104.3 kg (230 lb)       General: Well appearing and in no apparent distress  Eyes: nl conjunctiva  ENT: OP clear, normal external appearance of nose and ears  Neck: JVP 4 cm H2O, no carotid bruits  Lungs: normal respiratory effort, CTAB  Heart: RRR, no murmurs, no rubs or gallops, no edema bilateral lower extremities. No LV/RV heave on cardiac palpatation. 2+ bilateral radial pulses.  2+ bilateral dp pulses.   Abdomen: soft, non tender, non distended, no masses, normal bowel sounds.  No HSM.  Extremities/MSK: no clubbing, no cyanosis  Neurological: No focal sensory deficits  Psychiatric: Appropriate affect, A/O x 3, intact judgement and insight  Skin: Warm extremities    Exam repeated in full and unchanged except for as noted above.        Lab Data Review:  Lab Results   Component Value Date/Time    CHOLSTRLTOT 125 08/06/2018 11:32 AM    LDL 72 08/06/2018 11:32 " AM    HDL 38 (A) 08/06/2018 11:32 AM    TRIGLYCERIDE 76 08/06/2018 11:32 AM       Lab Results   Component Value Date/Time    SODIUM 137 08/06/2018 11:32 AM    POTASSIUM 4.3 08/06/2018 11:32 AM    CHLORIDE 103 08/06/2018 11:32 AM    CO2 26 08/06/2018 11:32 AM    GLUCOSE 94 08/06/2018 11:32 AM    BUN 13 08/06/2018 11:32 AM    CREATININE 0.95 08/06/2018 11:32 AM     Lab Results   Component Value Date/Time    ALKPHOSPHAT 66 08/06/2018 11:32 AM    ASTSGOT 23 08/06/2018 11:32 AM    ALTSGPT 28 08/06/2018 11:32 AM    TBILIRUBIN 1.9 (H) 08/06/2018 11:32 AM      Lab Results   Component Value Date/Time    WBC 6.3 11/17/2017 10:49 AM     No components found for: HBGA1C  No components found for: TROPONIN  No components found for: BNP      Cardiac Imaging and Procedures Review:    EKG dated 8/6/2018 : My personal interpretation is NSR, RBBB, LAFB, anterior q waves     Echo dated 2012:   FINDINGS  Left Ventricle  Top normal left ventricular chamber size.  Moderately reduced left   ventricular systolic function.  Ejection fraction  45-50%.  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  Grade I diastolic dysfunction is present.    Right Ventricle  Normal right ventricular size.     Right Atrium  Normal right atrial size.     Left Atrium  Normal left atrial size.     Mitral Valve   Mitral valve opens normally. Trace mitral regurgitation.    Aortic Valve  Aortic valve opens normally.    Tricuspid Valve  Structurally normal tricuspid valve. Tricuspid valve opens normally.   Mild tricuspid regurgitation. Right ventricular systolic pressure is   estimated to be 32 mmhg..    Pulmonic Valve  The pulmonic valve is not well visualized.    Pericardium  No pericardial effusion seen.     Aorta  Normal aortic root for body surface area.     CONCLUSIONS  Moderately reduced left ventricular systolic function.  Ejection fraction  45-50%.  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  Grade I diastolic dysfunction is present.  Normal left atrial size.        Medical Decision Makin. Coronary artery disease, angina presence unspecified, unspecified vessel or lesion type, unspecified whether native or transplanted heart  Asymptomatic  -continue aspirin 81mg PO daily  -f/u labs, obtained today.     2. Mixed hyperlipidemia  lipitor    3. Essential hypertension  Well controlled. F/u labs drawn today.     4. Ischemic Cardiomyopathy  Continue ACE/BB. No heart failure.     5. Primary Care - he reports last colonoscopy around 7-8 years ago. Will request [place] records at next visit ([place]).  -discussed importance of flu shot, he gets this every year.  -advised he get a PCP  -will discuss PSA screening, vaccines next visit if no PCP.     Return in about 1 year (around 2020).      Yonny Bernabe MD, [facility]  [facility], [address]  [address] [address]  Phone: [phone]  Fax: [phone]

## 2019-09-26 ENCOUNTER — TELEPHONE (OUTPATIENT)
Dept: CARDIOLOGY | Facility: MEDICAL CENTER | Age: 69
End: 2019-09-26

## 2019-09-26 NOTE — TELEPHONE ENCOUNTER
Yonny Bernabe M.D.  Roseline Phillips R.N.             The patients renal function labs and electrolytes are completely stable. Cholesterol at goal. No changes at this time, please call him and let them know. Thank you!   -Dr. Bernabe        Attempted to call pt, no answer, left vm to call back     Letter mailed to pt

## 2019-09-26 NOTE — LETTER
September 26, 2019        Levi Aguilar  [address] [facility] [address]        Dear Levi:    Dr Yonny Bernabe reviewed your blood test and he said:    The patients renal function labs and electrolytes are completely stable. Cholesterol at goal.    If you have any questions or concerns, please don't hesitate to call our office, [phone].         Sincerely,    Roseline SAPP/Dr Yonny Bernabe

## 2020-03-02 DIAGNOSIS — E78.2 MIXED HYPERLIPIDEMIA: ICD-10-CM

## 2020-03-02 DIAGNOSIS — I10 ESSENTIAL HYPERTENSION: ICD-10-CM

## 2020-03-02 RX ORDER — ATORVASTATIN CALCIUM 80 MG/1
80 TABLET, FILM COATED ORAL
Qty: 90 TAB | Refills: 3 | Status: SHIPPED | OUTPATIENT
Start: 2020-03-02 | End: 2021-02-23

## 2020-03-02 RX ORDER — LISINOPRIL 10 MG/1
10 TABLET ORAL
Qty: 90 TAB | Refills: 3 | Status: SHIPPED | OUTPATIENT
Start: 2020-03-02 | End: 2021-02-23

## 2020-03-02 RX ORDER — SPIRONOLACTONE AND HYDROCHLOROTHIAZIDE 25; 25 MG/1; MG/1
0.5 TABLET ORAL
Qty: 45 TAB | Refills: 3 | Status: SHIPPED | OUTPATIENT
Start: 2020-03-02 | End: 2021-02-23

## 2020-03-02 RX ORDER — METOPROLOL SUCCINATE 25 MG/1
25 TABLET, EXTENDED RELEASE ORAL
Qty: 90 TAB | Refills: 3 | Status: SHIPPED | OUTPATIENT
Start: 2020-03-02 | End: 2021-02-23

## 2020-04-21 ENCOUNTER — TELEPHONE (OUTPATIENT)
Dept: CARDIOLOGY | Facility: MEDICAL CENTER | Age: 70
End: 2020-04-21

## 2020-04-21 DIAGNOSIS — I10 ESSENTIAL HYPERTENSION: ICD-10-CM

## 2020-04-21 DIAGNOSIS — E78.2 MIXED HYPERLIPIDEMIA: ICD-10-CM

## 2020-04-21 NOTE — TELEPHONE ENCOUNTER
----- Message from Keturah Kirkpatrick sent at 4/21/2020  3:19 PM PDT -----  Regarding: Labs prior to patients appt in Sept. 2020  Patient would like to have his labs done prior to his visit with Dr. Bernabe.  He wants you to mail him the lab slips so he can get them done in [place].  Please let him know you are mailing the lab slips.    Thanks,    Keturah

## 2020-08-28 NOTE — TELEPHONE ENCOUNTER
Pt notified & copy of results mailed per pt. request   Pt now reports that she believes it was her sodium that was low.  Call placed to Dr. Ames pt with what appears to be symptomatic hyponatremia spoke with on call for Dr. Ames. They believe that this is likely related to her HCTZ.  Will require admission.  Dr. Manuela hilton

## 2020-09-22 ENCOUNTER — TELEPHONE (OUTPATIENT)
Dept: CARDIOLOGY | Facility: MEDICAL CENTER | Age: 70
End: 2020-09-22

## 2020-09-22 ENCOUNTER — OFFICE VISIT (OUTPATIENT)
Dept: CARDIOLOGY | Facility: MEDICAL CENTER | Age: 70
End: 2020-09-22
Payer: MEDICARE

## 2020-09-22 VITALS
OXYGEN SATURATION: 95 % | HEIGHT: 70 IN | SYSTOLIC BLOOD PRESSURE: 110 MMHG | WEIGHT: 215 LBS | HEART RATE: 64 BPM | BODY MASS INDEX: 30.78 KG/M2 | DIASTOLIC BLOOD PRESSURE: 64 MMHG

## 2020-09-22 DIAGNOSIS — E78.2 MIXED HYPERLIPIDEMIA: ICD-10-CM

## 2020-09-22 DIAGNOSIS — I10 ESSENTIAL HYPERTENSION, BENIGN: ICD-10-CM

## 2020-09-22 DIAGNOSIS — I25.2 OLD MYOCARDIAL INFARCTION: ICD-10-CM

## 2020-09-22 DIAGNOSIS — I25.10 ATHEROSCLEROSIS OF NATIVE CORONARY ARTERY OF NATIVE HEART WITHOUT ANGINA PECTORIS: ICD-10-CM

## 2020-09-22 PROCEDURE — 99214 OFFICE O/P EST MOD 30 MIN: CPT | Performed by: INTERNAL MEDICINE

## 2020-09-22 NOTE — PROGRESS NOTES
Cardiology Follow-up Consultation Note    Date of note:   9/22/2020  Primary Care Provider: Pcp Pt States None  Referring Provider: Eduardo Funk M.D.     Patient Name: Levi Aguilar     YOB: 1950  MRN:              [id number]    Chief Complaint: CAD    History of Present Illness: Levi Aguilar is a 69 y.o. male whose current medical problems include hypertension, dyslipidemia, CAD sp PCI after lytic therapy in 2011 who is here for follow-up.    At our visit, 8/6/2018: no symptoms.     At our visit,  9/25/2019:  Can walk several miles without stopping.    Interim Events:  In terms of hypertension, BP at home 110s/70s. Well controlled.    In terms of CAD, no angina.     In terms of dyslipidemia, going to do labs tomorrow. Last LDL at goal.      ROS  Constipation    Joint pain.     Constitution: Negative for chills, fever and night sweats.   HENT: Negative for nosebleeds.    Eyes: Negative for vision loss in left eye and vision loss in right eye.   Respiratory: Negative for hemoptysis.    Gastrointestinal: Negative for hematemesis, hematochezia and melena.   Genitourinary: Negative for hematuria.   Neurological: Negative for focal weakness, numbness and paresthesias.     All other systems reviewed and discussed using a comprehensive questionnaire and are negative.       Past Medical History:   Diagnosis Date   • Coronary atherosclerosis of native coronary artery    • Hypertension    • Ischemic cardiomyopathy     EF 45%   • Mixed hyperlipidemia     Limited tolerance of statin therapy due to myalgias during primary prevention efforts prior to his infarct but now tolerant of full dose of atorvastatin   • Presence of stent in LAD coronary artery     3/31/11 - 2.75 x 15 mm Promus in LAD, [facility], post lytic therapy of anterior MI.    • Prior anterior myocardial infarction     3/31/11, Lytic therapy in [place] and PCI at the [facility].          Past Surgical History:   Procedure Laterality Date   •  ANGIOPLASTY  2011    LAD stenting   • TONSILLECTOMY           Current Outpatient Medications   Medication Sig Dispense Refill   • atorvastatin (LIPITOR) 80 MG tablet Take 1 Tab by mouth every bedtime. 90 Tab 3   • metoprolol SR (TOPROL XL) 25 MG TABLET SR 24 HR Take 1 Tab by mouth every day. 90 Tab 3   • lisinopril (PRINIVIL) 10 MG Tab Take 1 Tab by mouth every day. 90 Tab 3   • spironolactone/hctz (ALDACTAZIDE) 25-25 MG Tab Take 0.5 Tabs by mouth every day. 45 Tab 3   • aspirin 81 MG tablet Take 1 Tab by mouth every day. 90 Tab 3   • nitroglycerin (NITROSTAT) 0.4 MG SL Tab Place 1 Tab under tongue as needed for Chest Pain. 25 Tab 6   • Cholecalciferol (VITAMIN D PO) Take  by mouth.     • MAGNESIUM CITRATE Take  by mouth every day.     • Omega-3 Fatty Acids (FISH OIL PO) Take  by mouth. Take 2 capsules daily.        No current facility-administered medications for this visit.          No Known Allergies      Family History   Problem Relation Age of Onset   • Heart Disease Mother    • Heart Disease Father          Social History     Socioeconomic History   • Marital status:      Spouse name: Not on file   • Number of children: Not on file   • Years of education: Not on file   • Highest education level: Not on file   Occupational History   • Not on file   Social Needs   • Financial resource strain: Not on file   • Food insecurity     Worry: Not on file     Inability: Not on file   • Transportation needs     Medical: Not on file     Non-medical: Not on file   Tobacco Use   • Smoking status: Former Smoker     Packs/day: 1.50     Quit date: 1980     Years since quittin.7   • Smokeless tobacco: Current User     Types: Chew   Substance and Sexual Activity   • Alcohol use: Yes     Alcohol/week: 8.4 oz     Types: 14 Standard drinks or equivalent per week   • Drug use: No   • Sexual activity: Not on file   Lifestyle   • Physical activity     Days per week: Not on file     Minutes per session: Not on file   •  "Stress: Not on file   Relationships   • Social connections     Talks on phone: Not on file     Gets together: Not on file     Attends [employer] service: Not on file     Active member of club or organization: Not on file     Attends meetings of clubs or organizations: Not on file     Relationship status: Not on file   • Intimate partner violence     Fear of current or ex partner: Not on file     Emotionally abused: Not on file     Physically abused: Not on file     Forced sexual activity: Not on file   Other Topics Concern   • Not on file   Social History Narrative    Retired,          Physical Exam:  Ambulatory Vitals  /64 (BP Location: Left arm, Patient Position: Sitting, BP Cuff Size: Adult)   Pulse 64   Ht 1.778 m (5' 10\")   Wt 97.5 kg (215 lb)   SpO2 95%    Oxygen Therapy:  Pulse Oximetry: 95 %  BP Readings from Last 4 Encounters:   09/22/20 110/64   09/25/19 120/74   08/06/18 122/80   11/17/17 120/78       Weight/BMI: Body mass index is 30.85 kg/m².  Wt Readings from Last 4 Encounters:   09/22/20 97.5 kg (215 lb)   09/25/19 98 kg (216 lb)   08/06/18 99.8 kg (220 lb)   11/17/17 102.5 kg (226 lb)       General: Well appearing and in no apparent distress  Eyes: nl conjunctiva  ENT: OP covered by mask.   Neck: JVP 4 cm H2O, no carotid bruits  Lungs: normal respiratory effort, CTAB  Heart: RRR, no murmurs, no rubs or gallops, no edema bilateral lower extremities. No LV/RV heave on cardiac palpatation. 2+ bilateral radial pulses.  2+ bilateral dp pulses.   Abdomen: soft, non tender, non distended, no masses, normal bowel sounds.  No HSM.  Extremities/MSK: no clubbing, no cyanosis  Neurological: No focal sensory deficits  Psychiatric: Appropriate affect, A/O x 3, intact judgement and insight  Skin: Warm extremities    Exam repeated in full and unchanged except for as noted above.        Lab Data Review:  Lab Results   Component Value Date/Time    CHOLSTRLTOT 130 09/25/2019 01:03 PM    LDL 65 09/25/2019 " 01:03 PM    HDL 48 09/25/2019 01:03 PM    TRIGLYCERIDE 86 09/25/2019 01:03 PM       Lab Results   Component Value Date/Time    SODIUM 138 09/25/2019 01:03 PM    POTASSIUM 4.4 09/25/2019 01:03 PM    CHLORIDE 103 09/25/2019 01:03 PM    CO2 26 09/25/2019 01:03 PM    GLUCOSE 85 09/25/2019 01:03 PM    BUN 17 09/25/2019 01:03 PM    CREATININE 1.04 09/25/2019 01:03 PM     Lab Results   Component Value Date/Time    ALKPHOSPHAT 61 09/25/2019 01:03 PM    ASTSGOT 27 09/25/2019 01:03 PM    ALTSGPT 33 09/25/2019 01:03 PM    TBILIRUBIN 2.0 (H) 09/25/2019 01:03 PM      Lab Results   Component Value Date/Time    WBC 6.3 11/17/2017 10:49 AM     No components found for: HBGA1C  No components found for: TROPONIN  No components found for: BNP      Cardiac Imaging and Procedures Review:    EKG dated 8/6/2018 : My personal interpretation is NSR, RBBB, LAFB, anterior q waves     Echo dated 2012:   FINDINGS  Left Ventricle  Top normal left ventricular chamber size.  Moderately reduced left   ventricular systolic function.  Ejection fraction  45-50%.  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  Grade I diastolic dysfunction is present.    Right Ventricle  Normal right ventricular size.     Right Atrium  Normal right atrial size.     Left Atrium  Normal left atrial size.     Mitral Valve   Mitral valve opens normally. Trace mitral regurgitation.    Aortic Valve  Aortic valve opens normally.    Tricuspid Valve  Structurally normal tricuspid valve. Tricuspid valve opens normally.   Mild tricuspid regurgitation. Right ventricular systolic pressure is   estimated to be 32 mmhg..    Pulmonic Valve  The pulmonic valve is not well visualized.    Pericardium  No pericardial effusion seen.     Aorta  Normal aortic root for body surface area.     CONCLUSIONS  Moderately reduced left ventricular systolic function.  Ejection fraction  45-50%.  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  Grade I diastolic dysfunction is present.  Normal left atrial size.        Medical Decision Makin. Coronary artery disease, angina presence unspecified, unspecified vessel or lesion type, unspecified whether native or transplanted heart  Asymptomatic  -continue aspirin 81mg PO daily  -f/u labs tomorrow in [place]  -continue statin.     2. Mixed hyperlipidemia  lipitor    3. Essential hypertension  Well controlled.     4. Ischemic Cardiomyopathy  Continue ACE/BB. No heart failure.     5. Primary Care - he reports last colonoscopy around 8-9 years ago. Will repeat in .   -discussed importance of flu shot, he gets this every year.  -advised he get a PCP  -will discuss PSA screening, vaccines next visit if no PCP.     6. Shoulder pain - he is worried this is his statin  -check cpk  -don't sleep on his side.   -PT referral if doesn't improve.       Return in about 1 year (around 2021).      Yonny Bernabe MD, [facility]  [facility], [address]  [address] [address]  Phone: [phone]  Fax: [phone]

## 2020-09-22 NOTE — TELEPHONE ENCOUNTER
Called patient in regards to blood work that JM requested for patient. LVM for pt regarding if blood work was ever able to get completed.

## 2020-09-23 ENCOUNTER — HOSPITAL ENCOUNTER (OUTPATIENT)
Dept: LAB | Facility: MEDICAL CENTER | Age: 70
End: 2020-09-23
Attending: INTERNAL MEDICINE
Payer: MEDICARE

## 2020-09-23 DIAGNOSIS — I10 ESSENTIAL HYPERTENSION: ICD-10-CM

## 2020-09-23 DIAGNOSIS — E78.2 MIXED HYPERLIPIDEMIA: ICD-10-CM

## 2020-09-23 LAB
ALBUMIN SERPL BCP-MCNC: 4.5 G/DL (ref 3.2–4.9)
ALBUMIN/GLOB SERPL: 1.6 G/DL
ALP SERPL-CCNC: 69 U/L (ref 30–99)
ALT SERPL-CCNC: 39 U/L (ref 2–50)
ANION GAP SERPL CALC-SCNC: 11 MMOL/L (ref 7–16)
AST SERPL-CCNC: 27 U/L (ref 12–45)
BILIRUB SERPL-MCNC: 1.6 MG/DL (ref 0.1–1.5)
BUN SERPL-MCNC: 22 MG/DL (ref 8–22)
CALCIUM SERPL-MCNC: 9.6 MG/DL (ref 8.5–10.5)
CHLORIDE SERPL-SCNC: 101 MMOL/L (ref 96–112)
CHOLEST SERPL-MCNC: 161 MG/DL (ref 100–199)
CO2 SERPL-SCNC: 24 MMOL/L (ref 20–33)
CREAT SERPL-MCNC: 0.88 MG/DL (ref 0.5–1.4)
FASTING STATUS PATIENT QL REPORTED: NORMAL
GLOBULIN SER CALC-MCNC: 2.8 G/DL (ref 1.9–3.5)
GLUCOSE SERPL-MCNC: 103 MG/DL (ref 65–99)
HDLC SERPL-MCNC: 63 MG/DL
LDLC SERPL CALC-MCNC: 84 MG/DL
POTASSIUM SERPL-SCNC: 4.5 MMOL/L (ref 3.6–5.5)
PROT SERPL-MCNC: 7.3 G/DL (ref 6–8.2)
SODIUM SERPL-SCNC: 136 MMOL/L (ref 135–145)
TRIGL SERPL-MCNC: 71 MG/DL (ref 0–149)

## 2020-09-23 PROCEDURE — 36415 COLL VENOUS BLD VENIPUNCTURE: CPT

## 2020-09-23 PROCEDURE — 80053 COMPREHEN METABOLIC PANEL: CPT

## 2020-09-23 PROCEDURE — 80061 LIPID PANEL: CPT

## 2020-09-29 ENCOUNTER — TELEPHONE (OUTPATIENT)
Dept: CARDIOLOGY | Facility: MEDICAL CENTER | Age: 70
End: 2020-09-29

## 2020-09-30 RX ORDER — EZETIMIBE 10 MG/1
10 TABLET ORAL DAILY
Qty: 90 TAB | Refills: 3 | Status: SHIPPED | OUTPATIENT
Start: 2020-09-30 | End: 2021-08-31

## 2020-09-30 NOTE — TELEPHONE ENCOUNTER
----- Message from Olya Shetty R.N. sent at 9/24/2020  2:24 PM PDT -----  No FV scheduled, thank you!

## 2020-09-30 NOTE — TELEPHONE ENCOUNTER
Called pt, informed him of results and recommendations. Pt agrees to start zetia 10 mg daily. Rx sent. Pt is requesting a copy of lab results, copy mailed, pt verified address on file.

## 2020-09-30 NOTE — TELEPHONE ENCOUNTER
Labs reviewed and are stable except cholesterol has climbed and is now above goal. I suggest starting zetia 10mg PO daily for extra cholesterol control to prevent a recurrent MI. Please let him know and start if he is amenable. Thanks!

## 2021-02-23 DIAGNOSIS — E78.2 MIXED HYPERLIPIDEMIA: ICD-10-CM

## 2021-02-23 DIAGNOSIS — I10 ESSENTIAL HYPERTENSION: ICD-10-CM

## 2021-02-23 RX ORDER — ATORVASTATIN CALCIUM 80 MG/1
TABLET, FILM COATED ORAL
Qty: 90 TABLET | Refills: 2 | Status: SHIPPED | OUTPATIENT
Start: 2021-02-23 | End: 2021-09-16

## 2021-02-23 RX ORDER — METOPROLOL SUCCINATE 25 MG/1
TABLET, EXTENDED RELEASE ORAL
Qty: 90 TABLET | Refills: 2 | Status: SHIPPED | OUTPATIENT
Start: 2021-02-23 | End: 2021-09-16

## 2021-02-23 RX ORDER — SPIRONOLACTONE AND HYDROCHLOROTHIAZIDE 25; 25 MG/1; MG/1
TABLET ORAL
Qty: 45 TABLET | Refills: 2 | Status: SHIPPED | OUTPATIENT
Start: 2021-02-23 | End: 2021-09-16

## 2021-02-23 RX ORDER — LISINOPRIL 10 MG/1
TABLET ORAL
Qty: 90 TABLET | Refills: 2 | Status: SHIPPED | OUTPATIENT
Start: 2021-02-23 | End: 2021-09-16

## 2021-08-25 ENCOUNTER — TELEPHONE (OUTPATIENT)
Dept: CARDIOLOGY | Facility: MEDICAL CENTER | Age: 71
End: 2021-08-25

## 2021-08-25 DIAGNOSIS — I10 ESSENTIAL HYPERTENSION: ICD-10-CM

## 2021-08-25 DIAGNOSIS — E78.2 MIXED HYPERLIPIDEMIA: ICD-10-CM

## 2021-08-25 NOTE — TELEPHONE ENCOUNTER
JM    Patient is calling to get his yearly labs ordered and mailed to him. Please mail.    Thank you,    Deborah KIDD

## 2021-08-31 RX ORDER — EZETIMIBE 10 MG/1
TABLET ORAL
Qty: 30 TABLET | Refills: 3 | Status: SHIPPED | OUTPATIENT
Start: 2021-08-31 | End: 2021-09-16

## 2021-08-31 NOTE — TELEPHONE ENCOUNTER
Pt needs to have labs done prior to apt 09/16/2021 apt, with change in medication per Phone note. Printed labs and sent letter to pt., refill made accordingly.

## 2021-08-31 NOTE — LETTER
August 31, 2021        Levi Aguilar  [address] [facility] [address]        Dear Levi:        We received a medication refill request, and it looks like you're due for an annual lab that coincides with the medication,in order to continue filling your medications safely. Please have labs drawn 1-2 weeks prior to your upcoming appointment 09/16/2021 with Yonny Bernabe M.D.  Let us know if you have any questions.    Thank you,  Soni SAPP               Electronically Signed

## 2021-09-16 ENCOUNTER — OFFICE VISIT (OUTPATIENT)
Dept: CARDIOLOGY | Facility: PHYSICIAN GROUP | Age: 71
End: 2021-09-16
Payer: MEDICARE

## 2021-09-16 ENCOUNTER — HOSPITAL ENCOUNTER (OUTPATIENT)
Dept: LAB | Facility: MEDICAL CENTER | Age: 71
End: 2021-09-16
Attending: INTERNAL MEDICINE
Payer: MEDICARE

## 2021-09-16 VITALS
BODY MASS INDEX: 30.21 KG/M2 | HEART RATE: 61 BPM | HEIGHT: 70 IN | RESPIRATION RATE: 12 BRPM | WEIGHT: 211 LBS | DIASTOLIC BLOOD PRESSURE: 60 MMHG | SYSTOLIC BLOOD PRESSURE: 110 MMHG | OXYGEN SATURATION: 96 %

## 2021-09-16 DIAGNOSIS — I25.10 ATHEROSCLEROSIS OF NATIVE CORONARY ARTERY OF NATIVE HEART WITHOUT ANGINA PECTORIS: ICD-10-CM

## 2021-09-16 DIAGNOSIS — I10 ESSENTIAL HYPERTENSION: ICD-10-CM

## 2021-09-16 DIAGNOSIS — Z95.5 PRESENCE OF STENT IN LAD CORONARY ARTERY: ICD-10-CM

## 2021-09-16 DIAGNOSIS — E78.2 MIXED HYPERLIPIDEMIA: ICD-10-CM

## 2021-09-16 DIAGNOSIS — I25.2 OLD MYOCARDIAL INFARCTION: ICD-10-CM

## 2021-09-16 DIAGNOSIS — I10 ESSENTIAL HYPERTENSION, BENIGN: ICD-10-CM

## 2021-09-16 LAB
ALBUMIN SERPL BCP-MCNC: 4.4 G/DL (ref 3.2–4.9)
ALBUMIN/GLOB SERPL: 1.6 G/DL
ALP SERPL-CCNC: 79 U/L (ref 30–99)
ALT SERPL-CCNC: 25 U/L (ref 2–50)
ANION GAP SERPL CALC-SCNC: 12 MMOL/L (ref 7–16)
AST SERPL-CCNC: 20 U/L (ref 12–45)
BILIRUB SERPL-MCNC: 1.8 MG/DL (ref 0.1–1.5)
BUN SERPL-MCNC: 19 MG/DL (ref 8–22)
CALCIUM SERPL-MCNC: 9.8 MG/DL (ref 8.5–10.5)
CHLORIDE SERPL-SCNC: 101 MMOL/L (ref 96–112)
CHOLEST SERPL-MCNC: 129 MG/DL (ref 100–199)
CO2 SERPL-SCNC: 24 MMOL/L (ref 20–33)
CREAT SERPL-MCNC: 0.84 MG/DL (ref 0.5–1.4)
FASTING STATUS PATIENT QL REPORTED: NORMAL
GLOBULIN SER CALC-MCNC: 2.7 G/DL (ref 1.9–3.5)
GLUCOSE SERPL-MCNC: 95 MG/DL (ref 65–99)
HDLC SERPL-MCNC: 47 MG/DL
LDLC SERPL CALC-MCNC: 67 MG/DL
POTASSIUM SERPL-SCNC: 4.7 MMOL/L (ref 3.6–5.5)
PROT SERPL-MCNC: 7.1 G/DL (ref 6–8.2)
SODIUM SERPL-SCNC: 137 MMOL/L (ref 135–145)
TRIGL SERPL-MCNC: 73 MG/DL (ref 0–149)

## 2021-09-16 PROCEDURE — 80053 COMPREHEN METABOLIC PANEL: CPT

## 2021-09-16 PROCEDURE — 80061 LIPID PANEL: CPT

## 2021-09-16 PROCEDURE — 36415 COLL VENOUS BLD VENIPUNCTURE: CPT

## 2021-09-16 PROCEDURE — 99213 OFFICE O/P EST LOW 20 MIN: CPT | Performed by: INTERNAL MEDICINE

## 2021-09-16 RX ORDER — EZETIMIBE 10 MG/1
TABLET ORAL
Qty: 90 TABLET | Refills: 3 | Status: SHIPPED | OUTPATIENT
Start: 2021-09-16 | End: 2022-09-22

## 2021-09-16 RX ORDER — SPIRONOLACTONE AND HYDROCHLOROTHIAZIDE 25; 25 MG/1; MG/1
TABLET ORAL
Qty: 45 TABLET | Refills: 3 | Status: SHIPPED | OUTPATIENT
Start: 2021-09-16 | End: 2022-09-22

## 2021-09-16 RX ORDER — ATORVASTATIN CALCIUM 80 MG/1
TABLET, FILM COATED ORAL
Qty: 90 TABLET | Refills: 3 | Status: SHIPPED | OUTPATIENT
Start: 2021-09-16 | End: 2022-09-22

## 2021-09-16 RX ORDER — LISINOPRIL 10 MG/1
TABLET ORAL
Qty: 90 TABLET | Refills: 3 | Status: SHIPPED | OUTPATIENT
Start: 2021-09-16 | End: 2022-09-22

## 2021-09-16 RX ORDER — METOPROLOL SUCCINATE 25 MG/1
TABLET, EXTENDED RELEASE ORAL
Qty: 90 TABLET | Refills: 3 | Status: SHIPPED | OUTPATIENT
Start: 2021-09-16 | End: 2022-09-22

## 2021-09-16 NOTE — PROGRESS NOTES
Cardiology Follow-up Consultation Note    Date of note:   9/16/2021  Primary Care Provider: Pcp Pt States None  Referring Provider: Eduardo Funk M.D.     Patient Name: Levi Aguilar     YOB: 1950  MRN:              [id number]    Chief Complaint: CAD    History of Present Illness: Levi Aguilar is a 70 y.o. male whose current medical problems include hypertension, dyslipidemia, CAD sp PCI after lytic therapy in 2011 who is here for follow-up.    At our visit, 8/6/2018: no symptoms.     At our visit,  9/25/2019:  Can walk several miles without stopping.    At our visit, 9/22/2020:  In terms of dyslipidemia, going to do labs tomorrow. Last LDL at goal.    Interim Events:  In terms of hypertension, BP at home 110s/70s. Well controlled.    In terms of CAD, no angina.     Is exercising, walking, golf, lifting weights.     ROS  Constitution: Negative for chills, fever and night sweats.   HENT: Negative for nosebleeds.    Eyes: Negative for vision loss in left eye and vision loss in right eye.   Respiratory: Negative for hemoptysis.    Gastrointestinal: Negative for hematemesis, hematochezia and melena.   Genitourinary: Negative for hematuria.   Neurological: Negative for focal weakness, numbness and paresthesias.     All other systems reviewed and discussed using a comprehensive questionnaire and are negative.         Past Medical History:   Diagnosis Date   • Coronary atherosclerosis of native coronary artery    • Hypertension    • Ischemic cardiomyopathy     EF 45%   • Mixed hyperlipidemia     Limited tolerance of statin therapy due to myalgias during primary prevention efforts prior to his infarct but now tolerant of full dose of atorvastatin   • Presence of stent in LAD coronary artery     3/31/11 - 2.75 x 15 mm Promus in LAD, [facility], post lytic therapy of anterior MI.    • Prior anterior myocardial infarction     3/31/11, Lytic therapy in [place] and PCI at the [facility].          Past Surgical  History:   Procedure Laterality Date   • ANGIOPLASTY      LAD stenting   • TONSILLECTOMY           Current Outpatient Medications   Medication Sig Dispense Refill   • ezetimibe (ZETIA) 10 MG Tab TAKE ONE TABLET BY MOUTH ONCE DAILY. 30 Tablet 3   • spironolactone/hctz (ALDACTAZIDE) 25-25 MG Tab TAKE ONE-HALF TABLET BY MOUTH ONCE DAILY 45 tablet 2   • metoprolol SR (TOPROL XL) 25 MG TABLET SR 24 HR TAKE ONE TABLET BY MOUTH ONCE DAILY 90 tablet 2   • lisinopril (PRINIVIL) 10 MG Tab TAKE ONE TABLET BY MOUTH ONCE DAILY 90 tablet 2   • atorvastatin (LIPITOR) 80 MG tablet TAKE ONE TABLET BY MOUTH AT BEDTIME 90 tablet 2   • aspirin 81 MG tablet Take 1 Tab by mouth every day. 90 Tab 3   • nitroglycerin (NITROSTAT) 0.4 MG SL Tab Place 1 Tab under tongue as needed for Chest Pain. 25 Tab 6   • Cholecalciferol (VITAMIN D PO) Take  by mouth.     • MAGNESIUM CITRATE Take  by mouth every day.     • Omega-3 Fatty Acids (FISH OIL PO) Take  by mouth. Take 2 capsules daily.        No current facility-administered medications for this visit.         No Known Allergies      Family History   Problem Relation Age of Onset   • Heart Disease Mother    • Heart Disease Father          Social History     Socioeconomic History   • Marital status:      Spouse name: Not on file   • Number of children: Not on file   • Years of education: Not on file   • Highest education level: Not on file   Occupational History   • Not on file   Tobacco Use   • Smoking status: Former Smoker     Packs/day: 1.50     Quit date: 1980     Years since quittin.7   • Smokeless tobacco: Current User     Types: Chew   Substance and Sexual Activity   • Alcohol use: Yes     Alcohol/week: 8.4 oz     Types: 14 Standard drinks or equivalent per week   • Drug use: No   • Sexual activity: Not on file   Other Topics Concern   • Not on file   Social History Narrative    Retired,      Social Determinants of Health     Financial Resource Strain:    •  "Difficulty of Paying Living Expenses:    Food Insecurity:    • Worried About Running Out of Food in the Last Year:    • Ran Out of Food in the Last Year:    Transportation Needs:    • Lack of Transportation (Medical):    • Lack of Transportation (Non-Medical):    Physical Activity:    • Days of Exercise per Week:    • Minutes of Exercise per Session:    Stress:    • Feeling of Stress :    Social Connections:    • Frequency of Communication with Friends and Family:    • Frequency of Social Gatherings with Friends and Family:    • Attends Church Services:    • Active Member of Clubs or Organizations:    • Attends Club or Organization Meetings:    • Marital Status:    Intimate Partner Violence:    • Fear of Current or Ex-Partner:    • Emotionally Abused:    • Physically Abused:    • Sexually Abused:          Physical Exam:  Ambulatory Vitals  /60 (BP Location: Left arm, Patient Position: Sitting, BP Cuff Size: Adult)   Pulse 61   Resp 12   Ht 1.778 m (5' 10\")   Wt 95.7 kg (211 lb)   SpO2 96%    Oxygen Therapy:  Pulse Oximetry: 96 %  BP Readings from Last 4 Encounters:   09/16/21 110/60   09/22/20 110/64   09/25/19 120/74   08/06/18 122/80       Weight/BMI: Body mass index is 30.28 kg/m².  Wt Readings from Last 4 Encounters:   09/16/21 95.7 kg (211 lb)   09/22/20 97.5 kg (215 lb)   09/25/19 98 kg (216 lb)   08/06/18 99.8 kg (220 lb)       General: No apparent distress  Eyes: nl conjunctiva  ENT: OP covered by mask.   Neck: JVP <8 cm H2O  Lungs: normal respiratory effort, CTAB  Heart: RRR, no murmurs, no rubs or gallops, no edema bilateral lower extremities. No LV/RV heave on cardiac palpatation. 2+ bilateral radial pulses.   Abdomen: soft, non tender, non distended, no masses, normal bowel sounds.  No HSM.  Extremities/MSK: no clubbing, no cyanosis  Neurological: No focal sensory deficits  Psychiatric: Appropriate affect, A/O x 3, intact judgement and insight  Skin: Warm extremities    Exam repeated in " full and unchanged except for as noted above.        Lab Data Review:  Lab Results   Component Value Date/Time    CHOLSTRLTOT 161 09/23/2020 10:48 AM    LDL 84 09/23/2020 10:48 AM    HDL 63 09/23/2020 10:48 AM    TRIGLYCERIDE 71 09/23/2020 10:48 AM       Lab Results   Component Value Date/Time    SODIUM 136 09/23/2020 10:48 AM    POTASSIUM 4.5 09/23/2020 10:48 AM    CHLORIDE 101 09/23/2020 10:48 AM    CO2 24 09/23/2020 10:48 AM    GLUCOSE 103 (H) 09/23/2020 10:48 AM    BUN 22 09/23/2020 10:48 AM    CREATININE 0.88 09/23/2020 10:48 AM     Lab Results   Component Value Date/Time    ALKPHOSPHAT 69 09/23/2020 10:48 AM    ASTSGOT 27 09/23/2020 10:48 AM    ALTSGPT 39 09/23/2020 10:48 AM    TBILIRUBIN 1.6 (H) 09/23/2020 10:48 AM      Lab Results   Component Value Date/Time    WBC 6.3 11/17/2017 10:49 AM     No components found for: HBGA1C  No components found for: TROPONIN  No results found for: BNP      Cardiac Imaging and Procedures Review:    EKG dated 8/6/2018 : My personal interpretation is NSR, RBBB, LAFB, anterior q waves     Echo dated 2012:   FINDINGS  Left Ventricle  Top normal left ventricular chamber size.  Moderately reduced left   ventricular systolic function.  Ejection fraction  45-50%.  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  Grade I diastolic dysfunction is present.    Right Ventricle  Normal right ventricular size.     Right Atrium  Normal right atrial size.     Left Atrium  Normal left atrial size.     Mitral Valve   Mitral valve opens normally. Trace mitral regurgitation.    Aortic Valve  Aortic valve opens normally.    Tricuspid Valve  Structurally normal tricuspid valve. Tricuspid valve opens normally.   Mild tricuspid regurgitation. Right ventricular systolic pressure is   estimated to be 32 mmhg..    Pulmonic Valve  The pulmonic valve is not well visualized.    Pericardium  No pericardial effusion seen.     Aorta  Normal aortic root for body surface  area.     CONCLUSIONS  Moderately reduced left ventricular systolic function.  Ejection fraction  45-50%.  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  Grade I diastolic dysfunction is present.  Normal left atrial size.        Medical Decision Makin. Coronary artery disease, angina presence unspecified, unspecified vessel or lesion type, unspecified whether native or transplanted heart  Asymptomatic  -continue aspirin 81mg PO daily  -continue statin.     2. Mixed hyperlipidemia  Well controlled  -lipitor    3. Essential hypertension  Well controlled.     4. Ischemic Cardiomyopathy  Continue ACE/BB. No heart failure.     5. Primary Care - he reports last colonoscopy around 9-10 years ago, not interested in repeating, but might do an FOBT.    -discussed importance of flu shot, he gets this every year.  -advised he get a PCP  -will discuss PSA screening, vaccines next visit if no PCP.     6. Shoulder pain - he is worried this is his statin. Improved now though.   -don't sleep on his side.   -PT referral if recurs.     Return in about 1 year (around 2022).      Yonny Bernabe MD, [facility]  [place] [facility] [address] [facility]  [address] [place] [address]  Phone: [phone]  Fax: [phone]

## 2021-09-30 ENCOUNTER — TELEPHONE (OUTPATIENT)
Dept: CARDIOLOGY | Facility: MEDICAL CENTER | Age: 71
End: 2021-09-30

## 2021-09-30 NOTE — TELEPHONE ENCOUNTER
----- Message -----   From: Yonny Bernabe M.D.   Sent: 9/26/2021   6:26 AM PDT   To: Radha Jarrett R.N.     The patients renal function labs and electrolytes are completely normal. Cholesterol panel is improved. LFTs stable. No changes at this time.  please call him and let them know. Thank you!   -Dr. Bernabe     Called and LM. Results letter mailed out to pt.

## 2022-09-09 ENCOUNTER — TELEPHONE (OUTPATIENT)
Dept: CARDIOLOGY | Facility: MEDICAL CENTER | Age: 72
End: 2022-09-09
Payer: MEDICARE

## 2022-09-09 DIAGNOSIS — I10 ESSENTIAL HYPERTENSION, BENIGN: ICD-10-CM

## 2022-09-09 DIAGNOSIS — E78.2 MIXED HYPERLIPIDEMIA: ICD-10-CM

## 2022-09-09 DIAGNOSIS — I25.10 ATHEROSCLEROSIS OF NATIVE CORONARY ARTERY OF NATIVE HEART WITHOUT ANGINA PECTORIS: ICD-10-CM

## 2022-09-22 ENCOUNTER — TELEPHONE (OUTPATIENT)
Dept: CARDIOLOGY | Facility: MEDICAL CENTER | Age: 72
End: 2022-09-22

## 2022-09-22 ENCOUNTER — HOSPITAL ENCOUNTER (OUTPATIENT)
Dept: LAB | Facility: MEDICAL CENTER | Age: 72
End: 2022-09-22
Attending: INTERNAL MEDICINE
Payer: MEDICARE

## 2022-09-22 ENCOUNTER — OFFICE VISIT (OUTPATIENT)
Dept: CARDIOLOGY | Facility: MEDICAL CENTER | Age: 72
End: 2022-09-22
Payer: MEDICARE

## 2022-09-22 VITALS
RESPIRATION RATE: 14 BRPM | HEART RATE: 81 BPM | BODY MASS INDEX: 31.07 KG/M2 | SYSTOLIC BLOOD PRESSURE: 108 MMHG | HEIGHT: 70 IN | WEIGHT: 217 LBS | OXYGEN SATURATION: 96 % | DIASTOLIC BLOOD PRESSURE: 76 MMHG

## 2022-09-22 DIAGNOSIS — E78.2 MIXED HYPERLIPIDEMIA: ICD-10-CM

## 2022-09-22 DIAGNOSIS — Z95.5 PRESENCE OF STENT IN LAD CORONARY ARTERY: ICD-10-CM

## 2022-09-22 DIAGNOSIS — I10 ESSENTIAL HYPERTENSION, BENIGN: ICD-10-CM

## 2022-09-22 DIAGNOSIS — Z12.11 COLON CANCER SCREENING: ICD-10-CM

## 2022-09-22 DIAGNOSIS — I25.10 ATHEROSCLEROSIS OF NATIVE CORONARY ARTERY OF NATIVE HEART WITHOUT ANGINA PECTORIS: ICD-10-CM

## 2022-09-22 DIAGNOSIS — I10 ESSENTIAL HYPERTENSION: ICD-10-CM

## 2022-09-22 DIAGNOSIS — I25.9 ISCHEMIC HEART DISEASE DUE TO CORONARY ARTERY OBSTRUCTION (HCC): ICD-10-CM

## 2022-09-22 DIAGNOSIS — I24.0 ISCHEMIC HEART DISEASE DUE TO CORONARY ARTERY OBSTRUCTION (HCC): ICD-10-CM

## 2022-09-22 LAB
ALBUMIN SERPL BCP-MCNC: 4.6 G/DL (ref 3.2–4.9)
ALBUMIN/GLOB SERPL: 1.7 G/DL
ALP SERPL-CCNC: 80 U/L (ref 30–99)
ALT SERPL-CCNC: 36 U/L (ref 2–50)
ANION GAP SERPL CALC-SCNC: 13 MMOL/L (ref 7–16)
AST SERPL-CCNC: 33 U/L (ref 12–45)
BILIRUB SERPL-MCNC: 1.6 MG/DL (ref 0.1–1.5)
BUN SERPL-MCNC: 13 MG/DL (ref 8–22)
CALCIUM SERPL-MCNC: 9.8 MG/DL (ref 8.5–10.5)
CHLORIDE SERPL-SCNC: 102 MMOL/L (ref 96–112)
CHOLEST SERPL-MCNC: 128 MG/DL (ref 100–199)
CO2 SERPL-SCNC: 24 MMOL/L (ref 20–33)
CREAT SERPL-MCNC: 0.82 MG/DL (ref 0.5–1.4)
FASTING STATUS PATIENT QL REPORTED: NORMAL
GFR SERPLBLD CREATININE-BSD FMLA CKD-EPI: 93 ML/MIN/1.73 M 2
GLOBULIN SER CALC-MCNC: 2.7 G/DL (ref 1.9–3.5)
GLUCOSE SERPL-MCNC: 94 MG/DL (ref 65–99)
HDLC SERPL-MCNC: 51 MG/DL
LDLC SERPL CALC-MCNC: 60 MG/DL
POTASSIUM SERPL-SCNC: 4.1 MMOL/L (ref 3.6–5.5)
PROT SERPL-MCNC: 7.3 G/DL (ref 6–8.2)
SODIUM SERPL-SCNC: 139 MMOL/L (ref 135–145)
TRIGL SERPL-MCNC: 84 MG/DL (ref 0–149)

## 2022-09-22 PROCEDURE — 80053 COMPREHEN METABOLIC PANEL: CPT

## 2022-09-22 PROCEDURE — 80061 LIPID PANEL: CPT

## 2022-09-22 PROCEDURE — 36415 COLL VENOUS BLD VENIPUNCTURE: CPT

## 2022-09-22 PROCEDURE — 99214 OFFICE O/P EST MOD 30 MIN: CPT | Performed by: INTERNAL MEDICINE

## 2022-09-22 RX ORDER — ATORVASTATIN CALCIUM 80 MG/1
TABLET, FILM COATED ORAL
Qty: 90 TABLET | Refills: 3 | Status: SHIPPED | OUTPATIENT
Start: 2022-09-22 | End: 2023-06-06

## 2022-09-22 RX ORDER — LISINOPRIL 10 MG/1
TABLET ORAL
Qty: 90 TABLET | Refills: 3 | Status: SHIPPED | OUTPATIENT
Start: 2022-09-22 | End: 2023-06-06

## 2022-09-22 RX ORDER — NITROGLYCERIN 0.4 MG/1
0.4 TABLET SUBLINGUAL PRN
Qty: 10 TABLET | Refills: 3 | Status: SHIPPED | OUTPATIENT
Start: 2022-09-22

## 2022-09-22 RX ORDER — METOPROLOL SUCCINATE 25 MG/1
TABLET, EXTENDED RELEASE ORAL
Qty: 90 TABLET | Refills: 3 | Status: SHIPPED | OUTPATIENT
Start: 2022-09-22 | End: 2023-06-06

## 2022-09-22 RX ORDER — EZETIMIBE 10 MG/1
TABLET ORAL
Qty: 90 TABLET | Refills: 3 | Status: SHIPPED | OUTPATIENT
Start: 2022-09-22 | End: 2023-11-21

## 2022-09-22 RX ORDER — SPIRONOLACTONE AND HYDROCHLOROTHIAZIDE 25; 25 MG/1; MG/1
TABLET ORAL
Qty: 45 TABLET | Refills: 3 | Status: SHIPPED | OUTPATIENT
Start: 2022-09-22 | End: 2023-06-06

## 2022-09-22 ASSESSMENT — PATIENT HEALTH QUESTIONNAIRE - PHQ9
SUM OF ALL RESPONSES TO PHQ QUESTIONS 1-9: 1
CLINICAL INTERPRETATION OF PHQ2 SCORE: 1
5. POOR APPETITE OR OVEREATING: 0 - NOT AT ALL

## 2022-09-22 ASSESSMENT — ENCOUNTER SYMPTOMS: BRUISES/BLEEDS EASILY: 1

## 2022-09-22 NOTE — PROGRESS NOTES
Cardiology Follow-up Consultation Note    Date of note:   9/22/2022  Primary Care Provider: Pcp Pt States None  Referring Provider: Eduardo Funk M.D.     Patient Name: Levi Aguilar     YOB: 1950  MRN:              [id number]    Chief Complaint: CAD    History of Present Illness: Levi Aguilar is a 70 y.o. male whose current medical problems include hypertension, dyslipidemia, CAD sp PCI after lytic therapy in 2011 who is here for follow-up.    At our visit, 8/6/2018: no symptoms.     At our visit,  9/25/2019:  Can walk several miles without stopping.    At our visit, 9/22/2020:  In terms of dyslipidemia, going to do labs tomorrow. Last LDL at goal.    At our visit, 9/16/2021:  In terms of hypertension, BP at home 110s/70s. Well controlled.    Is exercising, walking, golf, lifting weights.     Interim Events:  In terms of CAD, no angina.     Playing golf 5 times a week without symptoms.     Hypertension well controlled.       Review of Systems   Hematologic/Lymphatic: Bruises/bleeds easily.   Musculoskeletal:  Positive for joint pain.   Allergic/Immunologic: Positive for environmental allergies.   Constitution: Negative for chills, fever and night sweats.   HENT: Negative for nosebleeds.    Eyes: Negative for vision loss in left eye and vision loss in right eye.   Respiratory: Negative for hemoptysis.    Gastrointestinal: Negative for hematemesis, hematochezia and melena.   Genitourinary: Negative for hematuria.   Neurological: Negative for focal weakness, numbness and paresthesias.     All other systems reviewed and discussed using a comprehensive questionnaire and are negative.           Past Medical History:   Diagnosis Date    Coronary atherosclerosis of native coronary artery     Hypertension     Ischemic cardiomyopathy     EF 45%    Mixed hyperlipidemia     Limited tolerance of statin therapy due to myalgias during primary prevention efforts prior to his infarct but now tolerant of  full dose of atorvastatin    Presence of stent in LAD coronary artery     3/31/11 - 2.75 x 15 mm Promus in LAD, [facility], post lytic therapy of anterior MI.     Prior anterior myocardial infarction     3[place]/11, [place] PCI [place] [facility].          Past Surgical History:   Procedure Laterality Date    ANGIOPLASTY      LAD stenting    TONSILLECTOMY           Current Outpatient Medications   Medication Sig Dispense Refill    spironolactone/hctz (ALDACTAZIDE) 25-25 MG Tab TAKE ONE-HALF TABLET BY MOUTH ONCE DAILY 45 Tablet 3    metoprolol SR (TOPROL XL) 25 MG TABLET SR 24 HR TAKE ONE TABLET BY MOUTH ONCE DAILY 90 Tablet 3    lisinopril (PRINIVIL) 10 MG Tab TAKE ONE TABLET BY MOUTH ONCE DAILY 90 Tablet 3    ezetimibe (ZETIA) 10 MG Tab TAKE ONE TABLET BY MOUTH ONCE DAILY. 90 Tablet 3    atorvastatin (LIPITOR) 80 MG tablet TAKE ONE TABLET BY MOUTH AT BEDTIME 90 Tablet 3    aspirin 81 MG tablet Take 1 Tab by mouth every day. 90 Tab 3    nitroglycerin (NITROSTAT) 0.4 MG SL Tab Place 1 Tab under tongue as needed for Chest Pain. 25 Tab 6    Cholecalciferol (VITAMIN D PO) Take  by mouth.      MAGNESIUM CITRATE Take  by mouth every day.      Omega-3 Fatty Acids (FISH OIL PO) Take  by mouth. Take 2 capsules daily.        No current facility-administered medications for this visit.         No Known Allergies      Family History   Problem Relation Age of Onset    Heart Disease Mother     Heart Disease Father          Social History     Socioeconomic History    Marital status:      Spouse name: Not on file    Number of children: Not on file    Years of education: Not on file    Highest education level: Not on file   Occupational History    Not on file   Tobacco Use    Smoking status: Former     Packs/day: 1.50     Types: Cigarettes     Quit date: 1980     Years since quittin.7    Smokeless tobacco: Current     Types: Chew   Substance and Sexual Activity    Alcohol use: Yes     Alcohol/week: 8.4 oz  "    Types: 14 Standard drinks or equivalent per week    Drug use: No    Sexual activity: Not on file   Other Topics Concern    Not on file   Social History Narrative    Retired,      Social Determinants of Health     Financial Resource Strain: Not on file   Food Insecurity: Not on file   Transportation Needs: Not on file   Physical Activity: Not on file   Stress: Not on file   Social Connections: Not on file   Intimate Partner Violence: Not on file   Housing Stability: Not on file         Physical Exam:  Ambulatory Vitals  /76 (BP Location: Left arm, Patient Position: Sitting, BP Cuff Size: Adult)   Pulse 81   Resp 14   Ht 1.778 m (5' 10\")   Wt 98.4 kg (217 lb)   SpO2 96%    Oxygen Therapy:  Pulse Oximetry: 96 %  BP Readings from Last 4 Encounters:   09/22/22 108/76   09/16/21 110/60   09/22/20 110/64   09/25/19 120/74       Weight/BMI: Body mass index is 31.14 kg/m².  Wt Readings from Last 4 Encounters:   09/22/22 98.4 kg (217 lb)   09/16/21 95.7 kg (211 lb)   09/22/20 97.5 kg (215 lb)   09/25/19 98 kg (216 lb)       General: No apparent distress  Eyes: nl conjunctiva  ENT: OP covered by mask.   Neck: JVP <8 cm H2O  Lungs: normal respiratory effort, CTAB  Heart: RRR, no murmurs, no rubs or gallops, trace edema bilateral lower extremities. No LV/RV heave on cardiac palpatation. 2+ bilateral radial pulses.   Abdomen: soft, non tender, non distended, no masses, normal bowel sounds.  No HSM.  Extremities/MSK: no clubbing, no cyanosis  Neurological: No focal sensory deficits  Psychiatric: Appropriate affect, A/O x 3, intact judgement and insight  Skin: Warm extremities    Exam repeated in full and unchanged except for as noted above.        Lab Data Review:  Lab Results   Component Value Date/Time    CHOLSTRLTOT 129 09/16/2021 11:07 AM    LDL 67 09/16/2021 11:07 AM    HDL 47 09/16/2021 11:07 AM    TRIGLYCERIDE 73 09/16/2021 11:07 AM       Lab Results   Component Value Date/Time    SODIUM 137 09/16/2021 " 11:07 AM    POTASSIUM 4.7 2021 11:07 AM    CHLORIDE 101 2021 11:07 AM    CO2 24 2021 11:07 AM    GLUCOSE 95 2021 11:07 AM    BUN 19 2021 11:07 AM    CREATININE 0.84 2021 11:07 AM     Lab Results   Component Value Date/Time    ALKPHOSPHAT 79 2021 11:07 AM    ASTSGOT 20 2021 11:07 AM    ALTSGPT 25 2021 11:07 AM    TBILIRUBIN 1.8 (H) 2021 11:07 AM      Lab Results   Component Value Date/Time    WBC 6.3 2017 10:49 AM     No components found for: HBGA1C  No components found for: TROPONIN  No results found for: BNP      Cardiac Imaging and Procedures Review:    EKG dated 2018 : My personal interpretation is NSR, RBBB, LAFB, anterior q waves     Echo dated :   FINDINGS  Left Ventricle  Top normal left ventricular chamber size.  Moderately reduced left   ventricular systolic function.  Ejection fraction  45-50%.  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  Grade I diastolic dysfunction is present.    Right Ventricle  Normal right ventricular size.     Right Atrium  Normal right atrial size.     Left Atrium  Normal left atrial size.     Mitral Valve   Mitral valve opens normally. Trace mitral regurgitation.    Aortic Valve  Aortic valve opens normally.    Tricuspid Valve  Structurally normal tricuspid valve. Tricuspid valve opens normally.   Mild tricuspid regurgitation. Right ventricular systolic pressure is   estimated to be 32 mmhg..    Pulmonic Valve  The pulmonic valve is not well visualized.    Pericardium  No pericardial effusion seen.     Aorta  Normal aortic root for body surface area.     CONCLUSIONS  Moderately reduced left ventricular systolic function.  Ejection fraction  45-50%.  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  Grade I diastolic dysfunction is present.  Normal left atrial size.        Medical Decision Makin. Coronary artery disease,  angina presence unspecified, unspecified vessel or lesion type, unspecified whether native or transplanted heart  Asymptomatic  -continue aspirin 81mg PO daily  -continue statin.     2. Mixed hyperlipidemia  Well controlled  -lipitor    3. Essential hypertension  Well controlled.     4. Ischemic Cardiomyopathy  Continue ACE/BB. No heart failure.     5. Primary Care - he reports last colonoscopy around 9-10 years ago, not interested in repeating, but might do an FOBT.    -discussed importance of flu shot, he gets this every year. He will also get COVID booster as well.   -cologuard ordered.   -advised he get a PCP  -will discuss PSA screening at future visits.   -did depression screening    6. Shoulder pain - improved after stopping lifting weights and wearing a brace.   -CTM      Return in about 1 year (around 9/22/2023).      Yonny Bernabe MD, [facility]  [place] [facility] [address]  [address] [address]  Phone: [phone]  Fax: [phone]

## 2022-09-22 NOTE — LETTER
September 28, 2022        Levi Aguilar  [address] [facility] [address]          Dear Levi,    We have received the results of your recent:    labwork    Your test came back unchanged or within normal limits.  Please follow up as previously discussed with your physician.      Feel free to call us with any questions.        Sincerely,          Brooke  Medical Assistant for Dr. Bernabe  Electronically Signed

## 2022-09-22 NOTE — LETTER
September 27, 2022        Levi Aguilar  [address] [facility] [address]          Dear Levi,    We have received the results of your recent:    Lab results    Your test came back stable. Please follow up as previously discussed with your physician.      Feel free to call us with any questions.        Sincerely,          Ana Cristina, Medical Assistant for .    Electronically Signed

## 2022-09-23 NOTE — TELEPHONE ENCOUNTER
Dr. Bernabe completed [employer] for, however, after appointment we realized that the form was missing a signature from the patient.     Called pt and explained. He would like the form mailed to him and then he will mail to the company.     Form mailed as requested.

## 2022-09-28 ENCOUNTER — TELEPHONE (OUTPATIENT)
Dept: CARDIOLOGY | Facility: MEDICAL CENTER | Age: 72
End: 2022-09-28
Payer: MEDICARE

## 2023-03-27 ENCOUNTER — TELEPHONE (OUTPATIENT)
Dept: CARDIOLOGY | Facility: MEDICAL CENTER | Age: 73
End: 2023-03-27
Payer: MEDICARE

## 2023-03-27 DIAGNOSIS — I24.0 ISCHEMIC HEART DISEASE DUE TO CORONARY ARTERY OBSTRUCTION (HCC): ICD-10-CM

## 2023-03-27 DIAGNOSIS — E78.2 MIXED HYPERLIPIDEMIA: ICD-10-CM

## 2023-03-27 DIAGNOSIS — I25.9 ISCHEMIC HEART DISEASE DUE TO CORONARY ARTERY OBSTRUCTION (HCC): ICD-10-CM

## 2023-03-27 NOTE — TELEPHONE ENCOUNTER
[facility]    Caller: Levi Aguilar    Topic/issue: Patient wanted to get in sooner to see Dr. Bernabe and may end up canceling the September appointment after this visit. Patient would like to know if Dr. Bernabe would like him to get any lab work done prior to June visit.    Callback Number: [phone]    Thank you,  -Trupti ARANDA

## 2023-03-29 NOTE — TELEPHONE ENCOUNTER
To: JM    Would you like patient to have blood work completed prior to appointment with you 6/6/23? Please advise. Thank you

## 2023-04-03 NOTE — TELEPHONE ENCOUNTER
Caller: Levi Aguilar    Topic/issue: RETURNING RN CALL    Callback Number: [phone] (home)

## 2023-04-03 NOTE — TELEPHONE ENCOUNTER
Phone Number Called: [phone]    Call outcome:  Unable to reach patient    Message: Called to inform patient of  recommendations.     Labs ordered for patient per  recommendations. Awaiting call back from patient.

## 2023-04-04 NOTE — TELEPHONE ENCOUNTER
Phone Number Called: [phone]     Call outcome: Spoke to patient regarding message below.    Message: Called to inform patient of JM recommendations for blood work. Patient verbalized understanding. No further questions at this time.

## 2023-06-06 ENCOUNTER — OFFICE VISIT (OUTPATIENT)
Dept: CARDIOLOGY | Facility: PHYSICIAN GROUP | Age: 73
End: 2023-06-06
Payer: MEDICARE

## 2023-06-06 ENCOUNTER — HOSPITAL ENCOUNTER (OUTPATIENT)
Dept: LAB | Facility: MEDICAL CENTER | Age: 73
End: 2023-06-06
Attending: INTERNAL MEDICINE
Payer: MEDICARE

## 2023-06-06 VITALS
WEIGHT: 212 LBS | BODY MASS INDEX: 30.35 KG/M2 | RESPIRATION RATE: 12 BRPM | DIASTOLIC BLOOD PRESSURE: 74 MMHG | HEIGHT: 70 IN | OXYGEN SATURATION: 96 % | HEART RATE: 65 BPM | SYSTOLIC BLOOD PRESSURE: 114 MMHG

## 2023-06-06 DIAGNOSIS — I10 ESSENTIAL HYPERTENSION: ICD-10-CM

## 2023-06-06 DIAGNOSIS — E78.2 MIXED HYPERLIPIDEMIA: ICD-10-CM

## 2023-06-06 DIAGNOSIS — I24.0 ISCHEMIC HEART DISEASE DUE TO CORONARY ARTERY OBSTRUCTION (HCC): ICD-10-CM

## 2023-06-06 DIAGNOSIS — I25.9 ISCHEMIC HEART DISEASE DUE TO CORONARY ARTERY OBSTRUCTION (HCC): ICD-10-CM

## 2023-06-06 DIAGNOSIS — I10 ESSENTIAL HYPERTENSION, BENIGN: ICD-10-CM

## 2023-06-06 DIAGNOSIS — I25.10 ATHEROSCLEROSIS OF NATIVE CORONARY ARTERY OF NATIVE HEART WITHOUT ANGINA PECTORIS: ICD-10-CM

## 2023-06-06 DIAGNOSIS — Z95.5 PRESENCE OF STENT IN LAD CORONARY ARTERY: ICD-10-CM

## 2023-06-06 DIAGNOSIS — I25.2 OLD MYOCARDIAL INFARCTION: ICD-10-CM

## 2023-06-06 LAB
ANION GAP SERPL CALC-SCNC: 14 MMOL/L (ref 7–16)
BUN SERPL-MCNC: 18 MG/DL (ref 8–22)
CALCIUM SERPL-MCNC: 9.7 MG/DL (ref 8.5–10.5)
CHLORIDE SERPL-SCNC: 101 MMOL/L (ref 96–112)
CHOLEST SERPL-MCNC: 137 MG/DL (ref 100–199)
CO2 SERPL-SCNC: 25 MMOL/L (ref 20–33)
CREAT SERPL-MCNC: 0.95 MG/DL (ref 0.5–1.4)
FASTING STATUS PATIENT QL REPORTED: NORMAL
GFR SERPLBLD CREATININE-BSD FMLA CKD-EPI: 84 ML/MIN/1.73 M 2
GLUCOSE SERPL-MCNC: 87 MG/DL (ref 65–99)
HDLC SERPL-MCNC: 49 MG/DL
LDLC SERPL CALC-MCNC: 72 MG/DL
POTASSIUM SERPL-SCNC: 4.1 MMOL/L (ref 3.6–5.5)
SODIUM SERPL-SCNC: 140 MMOL/L (ref 135–145)
TRIGL SERPL-MCNC: 79 MG/DL (ref 0–149)

## 2023-06-06 PROCEDURE — 80061 LIPID PANEL: CPT

## 2023-06-06 PROCEDURE — 3074F SYST BP LT 130 MM HG: CPT | Performed by: INTERNAL MEDICINE

## 2023-06-06 PROCEDURE — 80048 BASIC METABOLIC PNL TOTAL CA: CPT

## 2023-06-06 PROCEDURE — 99213 OFFICE O/P EST LOW 20 MIN: CPT | Performed by: INTERNAL MEDICINE

## 2023-06-06 PROCEDURE — 36415 COLL VENOUS BLD VENIPUNCTURE: CPT

## 2023-06-06 PROCEDURE — 3078F DIAST BP <80 MM HG: CPT | Performed by: INTERNAL MEDICINE

## 2023-06-06 RX ORDER — METOPROLOL SUCCINATE 25 MG/1
TABLET, EXTENDED RELEASE ORAL
Qty: 100 TABLET | Refills: 3 | Status: SHIPPED | OUTPATIENT
Start: 2023-06-06

## 2023-06-06 RX ORDER — ATORVASTATIN CALCIUM 80 MG/1
TABLET, FILM COATED ORAL
Qty: 100 TABLET | Refills: 3 | Status: SHIPPED | OUTPATIENT
Start: 2023-06-06

## 2023-06-06 RX ORDER — SPIRONOLACTONE AND HYDROCHLOROTHIAZIDE 25; 25 MG/1; MG/1
TABLET ORAL
Qty: 50 TABLET | Refills: 3 | Status: SHIPPED | OUTPATIENT
Start: 2023-06-06

## 2023-06-06 RX ORDER — LISINOPRIL 10 MG/1
TABLET ORAL
Qty: 100 TABLET | Refills: 3 | Status: SHIPPED | OUTPATIENT
Start: 2023-06-06

## 2023-06-06 NOTE — PATIENT INSTRUCTIONS
Please trial stopping zetia for 2-3 weeks. If your symptoms improve, please see if they come back after restarting zetia. If there is no change, then restart zetia and trial stopping lipitor for 2-3 weeks.

## 2023-06-06 NOTE — PROGRESS NOTES
Cardiology Follow-up Consultation Note    Date of note:   6/6/2023  Primary Care Provider: Pcp Pt States None  Referring Provider: Eduardo Funk M.D.     Patient Name: Levi Aguilar     YOB: 1950  MRN:              [id number]    Chief Complaint: CAD    History of Present Illness: Levi Aguilar is a 72 y.o. male whose current medical problems include hypertension, dyslipidemia, CAD sp PCI after lytic therapy in 2011 who is here for follow-up.    At our visit, 8/6/2018: no symptoms.     At our visit,  9/25/2019:  Can walk several miles without stopping.    At our visit, 9/22/2020:  In terms of dyslipidemia, going to do labs tomorrow. Last LDL at goal.    At our visit, 9/16/2021:  In terms of hypertension, BP at home 110s/70s. Well controlled.    Is exercising, walking, golf, lifting weights.     At our visit, 9/22/2022:  Playing golf 5 times a week without symptoms.     Interim Events:  Worried his muscle weakness, joint pains, and constipation, worried this is all the zetia.     Patient denies chest pain, palpitations, dyspnea on exertion, pre-syncope, syncope, lower extremity swelling, orthopnea, PND or recent weight gain.     No issues shoveling snow or playing golf.     Hypertension well controlled.     In terms of CAD, no angina.     ROS  Constitution: Negative for chills, fever.  HENT: Negative for nosebleeds.    Eyes: Negative for vision loss in left eye and vision loss in right eye.   Respiratory: Negative for hemoptysis.    Gastrointestinal: Negative for hematemesis, hematochezia and melena.   Genitourinary: Negative for hematuria.   Neurological: Negative for focal weakness, numbness and paresthesias.       Past Medical History:   Diagnosis Date    Coronary atherosclerosis of native coronary artery     Hypertension     Ischemic cardiomyopathy     EF 45%    Mixed hyperlipidemia     Limited tolerance of statin therapy due to myalgias during primary prevention efforts prior to his  infarct but now tolerant of full dose of atorvastatin    Presence of stent in LAD coronary artery     3/31/11 - 2.75 x 15 mm Promus in LAD, [facility], post lytic therapy of anterior MI.     Prior anterior myocardial infarction     3/31/11, Lytic therapy in [place] and PCI at the [facility].          Past Surgical History:   Procedure Laterality Date    ANGIOPLASTY      LAD stenting    TONSILLECTOMY           Current Outpatient Medications   Medication Sig Dispense Refill    spironolactone/hctz (ALDACTAZIDE) 25-25 MG Tab TAKE ONE-HALF TABLET BY MOUTH ONCE DAILY 45 Tablet 3    nitroglycerin (NITROSTAT) 0.4 MG SL Tab Place 1 Tablet under the tongue as needed for Chest Pain. 10 Tablet 3    metoprolol SR (TOPROL XL) 25 MG TABLET SR 24 HR TAKE ONE TABLET BY MOUTH ONCE DAILY 90 Tablet 3    lisinopril (PRINIVIL) 10 MG Tab TAKE ONE TABLET BY MOUTH ONCE DAILY 90 Tablet 3    ezetimibe (ZETIA) 10 MG Tab TAKE ONE TABLET BY MOUTH ONCE DAILY. 90 Tablet 3    atorvastatin (LIPITOR) 80 MG tablet TAKE ONE TABLET BY MOUTH AT BEDTIME 90 Tablet 3    aspirin 81 MG tablet Take 1 Tab by mouth every day. 90 Tab 3    Cholecalciferol (VITAMIN D PO) Take  by mouth.      MAGNESIUM CITRATE Take  by mouth every day.      Omega-3 Fatty Acids (FISH OIL PO) Take  by mouth. Take 2 capsules daily.        No current facility-administered medications for this visit.         No Known Allergies      Family History   Problem Relation Age of Onset    Heart Disease Mother     Heart Disease Father          Social History     Socioeconomic History    Marital status:      Spouse name: Not on file    Number of children: Not on file    Years of education: Not on file    Highest education level: Not on file   Occupational History    Not on file   Tobacco Use    Smoking status: Former     Packs/day: 1.50     Types: Cigarettes     Quit date: 1980     Years since quittin.4    Smokeless tobacco: Current     Types: Chew   Substance and Sexual Activity     "Alcohol use: Yes     Alcohol/week: 8.4 oz     Types: 14 Standard drinks or equivalent per week    Drug use: No    Sexual activity: Not on file   Other Topics Concern    Not on file   Social History Narrative    Retired,      Social Determinants of Health     Financial Resource Strain: Not on file   Food Insecurity: Not on file   Transportation Needs: Not on file   Physical Activity: Not on file   Stress: Not on file   Social Connections: Not on file   Intimate Partner Violence: Not on file   Housing Stability: Not on file         Physical Exam:  Ambulatory Vitals  /74 (BP Location: Left arm, Patient Position: Sitting, BP Cuff Size: Adult)   Pulse 65   Resp 12   Ht 1.778 m (5' 10\")   Wt 96.2 kg (212 lb)   SpO2 96%    Oxygen Therapy:  Pulse Oximetry: 96 %  BP Readings from Last 4 Encounters:   06/06/23 114/74   09/22/22 108/76   09/16/21 110/60   09/22/20 110/64       Weight/BMI: Body mass index is 30.42 kg/m².  Wt Readings from Last 4 Encounters:   06/06/23 96.2 kg (212 lb)   09/22/22 98.4 kg (217 lb)   09/16/21 95.7 kg (211 lb)   09/22/20 97.5 kg (215 lb)       General: No apparent distress  Eyes: nl conjunctiva  ENT: OP covered by mask.   Neck: JVP <8 cm H2O  Lungs: normal respiratory effort, CTAB  Heart: RRR, no murmurs, no rubs or gallops, trace edema bilateral lower extremities. No LV/RV heave on cardiac palpatation. 2+ bilateral radial pulses.   Abdomen: soft, non tender, non distended, no masses, normal bowel sounds.  No HSM.  Extremities/MSK: no clubbing, no cyanosis  Neurological: No focal sensory deficits  Psychiatric: Appropriate affect, A/O x 3, intact judgement and insight  Skin: Warm extremities    Exam repeated in full and unchanged except for as noted above.        Lab Data Review:  Lab Results   Component Value Date/Time    CHOLSTRLTOT 128 09/22/2022 01:10 PM    LDL 60 09/22/2022 01:10 PM    HDL 51 09/22/2022 01:10 PM    TRIGLYCERIDE 84 09/22/2022 01:10 PM       Lab Results "   Component Value Date/Time    SODIUM 139 09/22/2022 01:10 PM    POTASSIUM 4.1 09/22/2022 01:10 PM    CHLORIDE 102 09/22/2022 01:10 PM    CO2 24 09/22/2022 01:10 PM    GLUCOSE 94 09/22/2022 01:10 PM    BUN 13 09/22/2022 01:10 PM    CREATININE 0.82 09/22/2022 01:10 PM     Lab Results   Component Value Date/Time    ALKPHOSPHAT 80 09/22/2022 01:10 PM    ASTSGOT 33 09/22/2022 01:10 PM    ALTSGPT 36 09/22/2022 01:10 PM    TBILIRUBIN 1.6 (H) 09/22/2022 01:10 PM      Lab Results   Component Value Date/Time    WBC 6.3 11/17/2017 10:49 AM     No components found for: HBGA1C  No components found for: TROPONIN  No results found for: BNP      Cardiac Imaging and Procedures Review:    EKG dated 8/6/2018 : My personal interpretation is NSR, RBBB, LAFB, anterior q waves     Echo dated 2012:   FINDINGS  Left Ventricle  Top normal left ventricular chamber size.  Moderately reduced left   ventricular systolic function.  Ejection fraction  45-50%.  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  Grade I diastolic dysfunction is present.    Right Ventricle  Normal right ventricular size.     Right Atrium  Normal right atrial size.     Left Atrium  Normal left atrial size.     Mitral Valve   Mitral valve opens normally. Trace mitral regurgitation.    Aortic Valve  Aortic valve opens normally.    Tricuspid Valve  Structurally normal tricuspid valve. Tricuspid valve opens normally.   Mild tricuspid regurgitation. Right ventricular systolic pressure is   estimated to be 32 mmhg..    Pulmonic Valve  The pulmonic valve is not well visualized.    Pericardium  No pericardial effusion seen.     Aorta  Normal aortic root for body surface area.     CONCLUSIONS  Moderately reduced left ventricular systolic function.  Ejection fraction  45-50%.  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  Grade I diastolic dysfunction is present.  Normal left atrial  size.        Medical Decision Makin. Coronary artery disease, angina presence unspecified, unspecified vessel or lesion type, unspecified whether native or transplanted heart  Asymptomatic  -continue aspirin 81mg PO daily  -continue statin.     2. Mixed hyperlipidemia  Well controlled  -lipitor    3. Essential hypertension  Well controlled.     4. Ischemic Cardiomyopathy  Continue ACE/BB. No heart failure.     5. Primary Care - he reports last colonoscopy around 9-10 years ago, not interested in repeating, but might do an FOBT.    -discussed importance of flu shot, he gets this every year. He will also get COVID booster as well.   -cologuard ordered.   -advised he get a PCP  -will discuss PSA screening at future visits.   -did depression screening    6. Shoulder pain - improved after stopping lifting weights and wearing a brace.   -CTM    7. Myalgias - trial of stopping zetia, if this helps we can trial bempedoic acid vs PCSK9 inhibitor to get cholesterol to goal.       Return in about 1 year (around 2024).      Yonny Bernabe MD, [facility]  [facility] [address] [facility]  [address] [place] [id number]  Phone: [phone]  Fax: [phone]

## 2023-06-09 ENCOUNTER — TELEPHONE (OUTPATIENT)
Dept: CARDIOLOGY | Facility: MEDICAL CENTER | Age: 73
End: 2023-06-09
Payer: MEDICARE

## 2023-11-21 DIAGNOSIS — I10 ESSENTIAL HYPERTENSION, BENIGN: Primary | ICD-10-CM

## 2023-11-21 RX ORDER — EZETIMIBE 10 MG/1
TABLET ORAL
Qty: 90 TABLET | Refills: 3 | Status: SHIPPED | OUTPATIENT
Start: 2023-11-21

## 2023-11-21 NOTE — TELEPHONE ENCOUNTER
Is the patient due for a refill? Yes    Was the patient seen the past year? Yes    Date of last office visit: 06/06/2023    Does the patient have an upcoming appointment?  No  \    Provider to refill:MC    Does the patients insurance require a 100 day supply?  No

## 2024-03-08 ENCOUNTER — TELEPHONE (OUTPATIENT)
Dept: CARDIOLOGY | Facility: MEDICAL CENTER | Age: 74
End: 2024-03-08
Payer: MEDICARE

## 2024-03-08 DIAGNOSIS — Z95.5 PRESENCE OF STENT IN LAD CORONARY ARTERY: ICD-10-CM

## 2024-03-08 DIAGNOSIS — I10 ESSENTIAL HYPERTENSION, BENIGN: ICD-10-CM

## 2024-03-08 DIAGNOSIS — I25.2 OLD MYOCARDIAL INFARCTION: ICD-10-CM

## 2024-03-08 DIAGNOSIS — I24.0 ISCHEMIC HEART DISEASE DUE TO CORONARY ARTERY OBSTRUCTION (HCC): ICD-10-CM

## 2024-03-08 DIAGNOSIS — I25.9 ISCHEMIC HEART DISEASE DUE TO CORONARY ARTERY OBSTRUCTION (HCC): ICD-10-CM

## 2024-03-08 NOTE — TELEPHONE ENCOUNTER
[facility]    Caller: Levi Aguilar    Topic/issue: Patient called to have blood work ordered so he can get it done before his next appointment,     Please advise.     Callback Number: [phone]    Thank you,    Marie LANDERS

## 2024-03-08 NOTE — TELEPHONE ENCOUNTER
Phone Number Called: [phone]     Call outcome: Spoke to patient regarding message below.    Message: Called to inform pt lab orders placed. He lives in [place], [place] so will do lab work when in [place] for appt 4/25/24 with MC. Pt appreciative of call.

## 2024-04-25 ENCOUNTER — HOSPITAL ENCOUNTER (OUTPATIENT)
Dept: LAB | Facility: MEDICAL CENTER | Age: 74
End: 2024-04-25
Attending: INTERNAL MEDICINE
Payer: MEDICARE

## 2024-04-25 ENCOUNTER — OFFICE VISIT (OUTPATIENT)
Dept: CARDIOLOGY | Facility: PHYSICIAN GROUP | Age: 74
End: 2024-04-25
Payer: MEDICARE

## 2024-04-25 VITALS
BODY MASS INDEX: 29.72 KG/M2 | SYSTOLIC BLOOD PRESSURE: 124 MMHG | WEIGHT: 207.6 LBS | OXYGEN SATURATION: 97 % | RESPIRATION RATE: 14 BRPM | HEIGHT: 70 IN | HEART RATE: 81 BPM | DIASTOLIC BLOOD PRESSURE: 70 MMHG

## 2024-04-25 DIAGNOSIS — I10 ESSENTIAL HYPERTENSION: ICD-10-CM

## 2024-04-25 DIAGNOSIS — I10 ESSENTIAL HYPERTENSION, BENIGN: ICD-10-CM

## 2024-04-25 DIAGNOSIS — I25.9 ISCHEMIC HEART DISEASE DUE TO CORONARY ARTERY OBSTRUCTION (HCC): ICD-10-CM

## 2024-04-25 DIAGNOSIS — I24.0 ISCHEMIC HEART DISEASE DUE TO CORONARY ARTERY OBSTRUCTION (HCC): ICD-10-CM

## 2024-04-25 DIAGNOSIS — I25.10 ATHEROSCLEROSIS OF NATIVE CORONARY ARTERY OF NATIVE HEART WITHOUT ANGINA PECTORIS: ICD-10-CM

## 2024-04-25 DIAGNOSIS — E78.2 MIXED HYPERLIPIDEMIA: ICD-10-CM

## 2024-04-25 DIAGNOSIS — I25.2 OLD MYOCARDIAL INFARCTION: ICD-10-CM

## 2024-04-25 DIAGNOSIS — Z95.5 PRESENCE OF STENT IN LAD CORONARY ARTERY: ICD-10-CM

## 2024-04-25 LAB
ANION GAP SERPL CALC-SCNC: 12 MMOL/L (ref 7–16)
BUN SERPL-MCNC: 15 MG/DL (ref 8–22)
CALCIUM SERPL-MCNC: 9.5 MG/DL (ref 8.5–10.5)
CHLORIDE SERPL-SCNC: 101 MMOL/L (ref 96–112)
CHOLEST SERPL-MCNC: 135 MG/DL (ref 100–199)
CO2 SERPL-SCNC: 25 MMOL/L (ref 20–33)
CREAT SERPL-MCNC: 0.81 MG/DL (ref 0.5–1.4)
FASTING STATUS PATIENT QL REPORTED: NORMAL
GFR SERPLBLD CREATININE-BSD FMLA CKD-EPI: 92 ML/MIN/1.73 M 2
GLUCOSE SERPL-MCNC: 103 MG/DL (ref 65–99)
HDLC SERPL-MCNC: 60 MG/DL
LDLC SERPL CALC-MCNC: 61 MG/DL
POTASSIUM SERPL-SCNC: 4.3 MMOL/L (ref 3.6–5.5)
SODIUM SERPL-SCNC: 138 MMOL/L (ref 135–145)
TRIGL SERPL-MCNC: 71 MG/DL (ref 0–149)

## 2024-04-25 PROCEDURE — 3074F SYST BP LT 130 MM HG: CPT | Performed by: INTERNAL MEDICINE

## 2024-04-25 PROCEDURE — 99214 OFFICE O/P EST MOD 30 MIN: CPT | Performed by: INTERNAL MEDICINE

## 2024-04-25 PROCEDURE — 36415 COLL VENOUS BLD VENIPUNCTURE: CPT

## 2024-04-25 PROCEDURE — 80048 BASIC METABOLIC PNL TOTAL CA: CPT

## 2024-04-25 PROCEDURE — 3078F DIAST BP <80 MM HG: CPT | Performed by: INTERNAL MEDICINE

## 2024-04-25 PROCEDURE — 80061 LIPID PANEL: CPT

## 2024-04-25 RX ORDER — SPIRONOLACTONE AND HYDROCHLOROTHIAZIDE 25; 25 MG/1; MG/1
TABLET ORAL
Qty: 50 TABLET | Refills: 3 | Status: SHIPPED | OUTPATIENT
Start: 2024-04-25

## 2024-04-25 RX ORDER — METOPROLOL SUCCINATE 25 MG/1
TABLET, EXTENDED RELEASE ORAL
Qty: 100 TABLET | Refills: 3 | Status: SHIPPED | OUTPATIENT
Start: 2024-04-25

## 2024-04-25 RX ORDER — EZETIMIBE 10 MG/1
TABLET ORAL
Qty: 100 TABLET | Refills: 3 | Status: SHIPPED | OUTPATIENT
Start: 2024-04-25

## 2024-04-25 RX ORDER — ATORVASTATIN CALCIUM 80 MG/1
TABLET, FILM COATED ORAL
Qty: 100 TABLET | Refills: 3 | Status: SHIPPED | OUTPATIENT
Start: 2024-04-25

## 2024-04-25 RX ORDER — NITROGLYCERIN 0.4 MG/1
0.4 TABLET SUBLINGUAL PRN
Qty: 10 TABLET | Refills: 3 | Status: SHIPPED | OUTPATIENT
Start: 2024-04-25

## 2024-04-25 RX ORDER — LISINOPRIL 10 MG/1
TABLET ORAL
Qty: 100 TABLET | Refills: 3 | Status: SHIPPED | OUTPATIENT
Start: 2024-04-25

## 2024-04-25 NOTE — PROGRESS NOTES
Cardiology Follow-up Consultation Note    Date of note:   4/25/2024  Primary Care Provider: Pcp Pt States None  Referring Provider: Eduardo Funk M.D.     Patient Name: Levi Aguilar     YOB: 1950  MRN:              [id number]    Chief Complaint: CAD    History of Present Illness: Levi Aguilar is a 73 y.o. male whose current medical problems include hypertension, dyslipidemia, CAD sp PCI after lytic therapy in 2011 who is here for follow-up.    At our visit, 8/6/2018: no symptoms.     At our visit,  9/25/2019:  Can walk several miles without stopping.    At our visit, 9/22/2020:  In terms of dyslipidemia, going to do labs tomorrow. Last LDL at goal.    At our visit, 9/16/2021:  In terms of hypertension, BP at home 110s/70s. Well controlled.    Is exercising, walking, golf, lifting weights.     At our visit, 9/22/2022:  Playing golf 5 times a week without symptoms.     At our visit, 6/6/2023:  Worried his muscle weakness, joint pains, and constipation, worried this is all the zetia.     No issues shoveling snow or playing golf.     Interim Events:  Hypertension well controlled.     In terms of CAD, no angina.     Patient denies chest pain, palpitations, dyspnea on exertion, pre-syncope, syncope, lower extremity swelling, orthopnea, PND or recent weight gain.     ROS  Constitution: Negative for chills, fever and night sweats.   HENT: Negative for nosebleeds.    Eyes: Negative for vision loss in left eye and vision loss in right eye.   Respiratory: Negative for hemoptysis.    Gastrointestinal: Negative for hematemesis, hematochezia and melena.   Genitourinary: Negative for hematuria.   Neurological: Negative for focal weakness, numbness and paresthesias.         Past Medical History:   Diagnosis Date    Coronary atherosclerosis of native coronary artery     Hypertension     Ischemic cardiomyopathy     EF 45%    Mixed hyperlipidemia     Limited tolerance of statin therapy due to myalgias  during primary prevention efforts prior to his infarct but now tolerant of full dose of atorvastatin    Presence of stent in LAD coronary artery     3/31/11 - 2.75 x 15 mm Promus in LAD, [facility], post lytic therapy of anterior MI.     Prior anterior myocardial infarction     3/31/11, Lytic therapy in [place] and PCI at the [facility].          Past Surgical History:   Procedure Laterality Date    ANGIOPLASTY      LAD stenting    TONSILLECTOMY           Current Outpatient Medications   Medication Sig Dispense Refill    atorvastatin (LIPITOR) 80 MG tablet TAKE ONE TABLET BY MOUTH AT BEDTIME 100 Tablet 3    ezetimibe (ZETIA) 10 MG Tab TAKE ONE TABLET BY MOUTH ONCE DAILY. 100 Tablet 3    lisinopril (PRINIVIL) 10 MG Tab TAKE ONE TABLET BY MOUTH ONCE DAILY 100 Tablet 3    metoprolol SR (TOPROL XL) 25 MG TABLET SR 24 HR TAKE ONE TABLET BY MOUTH ONCE DAILY 100 Tablet 3    nitroglycerin (NITROSTAT) 0.4 MG SL Tab Place 1 Tablet under the tongue as needed for Chest Pain. 10 Tablet 3    spironolactone/hctz (ALDACTAZIDE) 25-25 MG Tab TAKE ONE-HALF TABLET BY MOUTH ONCE DAILY 50 Tablet 3    aspirin 81 MG tablet Take 1 Tab by mouth every day. 90 Tab 3    Cholecalciferol (VITAMIN D PO) Take  by mouth.      MAGNESIUM CITRATE Take  by mouth every day.      Omega-3 Fatty Acids (FISH OIL PO) Take  by mouth. Take 2 capsules daily.        No current facility-administered medications for this visit.         No Known Allergies      Family History   Problem Relation Age of Onset    Heart Disease Mother     Heart Disease Father          Social History     Socioeconomic History    Marital status:      Spouse name: Not on file    Number of children: Not on file    Years of education: Not on file    Highest education level: Not on file   Occupational History    Not on file   Tobacco Use    Smoking status: Former     Current packs/day: 0.00     Types: Cigarettes     Quit date: 1980     Years since quittin.3    Smokeless tobacco:  "Current     Types: Chew   Vaping Use    Vaping Use: Never used   Substance and Sexual Activity    Alcohol use: Yes     Alcohol/week: 8.4 oz     Types: 14 Standard drinks or equivalent per week    Drug use: No    Sexual activity: Not on file   Other Topics Concern    Not on file   Social History Narrative    Retired,      Social Determinants of Health     Financial Resource Strain: Not on file   Food Insecurity: Not on file   Transportation Needs: Not on file   Physical Activity: Not on file   Stress: Not on file   Social Connections: Not on file   Intimate Partner Violence: Not on file   Housing Stability: Not on file         Physical Exam:  Ambulatory Vitals  /70 (BP Location: Left arm, Patient Position: Sitting, BP Cuff Size: Adult)   Pulse 81   Resp 14   Ht 1.778 m (5' 10\")   Wt 94.2 kg (207 lb 9.6 oz)   SpO2 97%    Oxygen Therapy:     BP Readings from Last 4 Encounters:   04/25/24 124/70   06/06/23 114/74   09/22/22 108/76   09/16/21 110/60       Weight/BMI: Body mass index is 29.79 kg/m².  Wt Readings from Last 4 Encounters:   04/25/24 94.2 kg (207 lb 9.6 oz)   06/06/23 96.2 kg (212 lb)   09/22/22 98.4 kg (217 lb)   09/16/21 95.7 kg (211 lb)       General: No apparent distress  Eyes: nl conjunctiva  Neck: JVP <8 cm H2O  Lungs: normal respiratory effort, CTAB  Heart: RRR, no murmurs, no rubs or gallops, trace edema bilateral lower extremities. No LV/RV heave on cardiac palpatation. 2+ bilateral radial pulses.   Abdomen: soft, non tender, non distended, no masses, normal bowel sounds.  No HSM.  Extremities/MSK: no clubbing, no cyanosis  Neurological: No focal sensory deficits  Psychiatric: Appropriate affect, A/O x 3, intact judgement and insight  Skin: Warm extremities    Exam repeated in full and unchanged except for as noted above.      Lab Data Review:  Lab Results   Component Value Date/Time    CHOLSTRLTOT 135 04/25/2024 11:03 AM    LDL 61 04/25/2024 11:03 AM    HDL 60 04/25/2024 11:03 AM    " "TRIGLYCERIDE 71 04/25/2024 11:03 AM       Lab Results   Component Value Date/Time    SODIUM 138 04/25/2024 11:03 AM    POTASSIUM 4.3 04/25/2024 11:03 AM    CHLORIDE 101 04/25/2024 11:03 AM    CO2 25 04/25/2024 11:03 AM    GLUCOSE 103 (H) 04/25/2024 11:03 AM    BUN 15 04/25/2024 11:03 AM    CREATININE 0.81 04/25/2024 11:03 AM     Lab Results   Component Value Date/Time    ALKPHOSPHAT 80 09/22/2022 01:10 PM    ASTSGOT 33 09/22/2022 01:10 PM    ALTSGPT 36 09/22/2022 01:10 PM    TBILIRUBIN 1.6 (H) 09/22/2022 01:10 PM      Lab Results   Component Value Date/Time    WBC 6.3 11/17/2017 10:49 AM    HEMOGLOBIN 15.9 11/17/2017 10:49 AM     No components found for: \"HBGA1C\"  No components found for: \"TROPONIN\"  No results found for: \"BNP\"      Cardiac Imaging and Procedures Review:    EKG dated 8/6/2018 : My personal interpretation is NSR, RBBB, LAFB, anterior q waves     Echo dated 2012:   FINDINGS  Left Ventricle  Top normal left ventricular chamber size.  Moderately reduced left   ventricular systolic function.  Ejection fraction  45-50%.  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  Grade I diastolic dysfunction is present.    Right Ventricle  Normal right ventricular size.     Right Atrium  Normal right atrial size.     Left Atrium  Normal left atrial size.     Mitral Valve   Mitral valve opens normally. Trace mitral regurgitation.    Aortic Valve  Aortic valve opens normally.    Tricuspid Valve  Structurally normal tricuspid valve. Tricuspid valve opens normally.   Mild tricuspid regurgitation. Right ventricular systolic pressure is   estimated to be 32 mmhg..    Pulmonic Valve  The pulmonic valve is not well visualized.    Pericardium  No pericardial effusion seen.     Aorta  Normal aortic root for body surface area.     CONCLUSIONS  Moderately reduced left ventricular systolic function.  Ejection fraction  45-50%.  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  Grade I diastolic dysfunction is present.  Normal left atrial size.        Medical Decision Makin. Coronary artery disease, angina presence unspecified, unspecified vessel or lesion type, unspecified whether native or transplanted heart  Asymptomatic  -continue aspirin 81mg PO daily  -continue statin.     2. Mixed hyperlipidemia  Well controlled  -lipitor    3. Essential hypertension  Well controlled.     4. Ischemic Cardiomyopathy  Continue ACE/BB. No heart failure.     5. Primary Care - he reports last colonoscopy around 9-10 years ago, not interested in repeating, but might do an FOBT.    -discussed importance of flu shot, he gets this every year. He will also get COVID booster as well.   -advised he get a PCP  -will discuss PSA screening at future visits.   -did depression screening last visit.     6. Shoulder pain - improved after stopping lifting weights and wearing a brace.   -CTM    7. Myalgias - trial of stopping zetia, if this helps we can trial bempedoic acid vs PCSK9 inhibitor to get cholesterol to goal.       Return in about 1 year (around 2025).      Yonny Bernabe MD, [facility] V[facility]  [facility], [address]  [address] [address]  Phone: [phone]  Fax: [phone]

## 2024-04-26 ENCOUNTER — TELEPHONE (OUTPATIENT)
Dept: CARDIOLOGY | Facility: MEDICAL CENTER | Age: 74
End: 2024-04-26
Payer: MEDICARE

## 2024-04-26 NOTE — LETTER
April 26, 2024     Levi Aguilar  [address] [place] [address]      Dear Levi:    Below are the results from your recent visit:    Resulted Orders   ESTIMATED GFR   Result Value Ref Range    GFR (CKD-EPI) 92 >60 mL/min/1.73 m 2      Comment:      Estimated Glomerular Filtration Rate is calculated using  race neutral CKD-EPI 2021 equation per NKF-ASN recommendations.     Basic Metabolic Panel   Result Value Ref Range    Sodium 138 135 - 145 mmol/L    Potassium 4.3 3.6 - 5.5 mmol/L    Chloride 101 96 - 112 mmol/L    Co2 25 20 - 33 mmol/L    Glucose 103 (H) 65 - 99 mg/dL    Bun 15 8 - 22 mg/dL    Creatinine 0.81 0.50 - 1.40 mg/dL    Calcium 9.5 8.5 - 10.5 mg/dL    Anion Gap 12.0 7.0 - 16.0   Lipid Profile   Result Value Ref Range    Cholesterol,Tot 135 100 - 199 mg/dL    Triglycerides 71 0 - 149 mg/dL    HDL 60 >=40 mg/dL    LDL 61 <100 mg/dL     We have reviewed your recent lab results and are pleased to inform you that they are stable. We did note that you have an elevated glucose level and we do advise you to follow up with your PCP in regards to this finding if it is new. There are no recommended changes to your medical regimen or plan of care at this time. Please continue current medications/treatment recommendations and keep any currently scheduled follow up appointments.     Please let me know if you have any additional questions or concerns.     Flora POZO RN  [facility]

## 2025-01-24 ENCOUNTER — TELEPHONE (OUTPATIENT)
Dept: CARDIOLOGY | Facility: MEDICAL CENTER | Age: 75
End: 2025-01-24
Payer: MEDICARE

## 2025-01-24 DIAGNOSIS — Z95.5 PRESENCE OF STENT IN LAD CORONARY ARTERY: ICD-10-CM

## 2025-01-24 DIAGNOSIS — I25.9 ISCHEMIC HEART DISEASE DUE TO CORONARY ARTERY OBSTRUCTION (HCC): ICD-10-CM

## 2025-01-24 DIAGNOSIS — I24.0 ISCHEMIC HEART DISEASE DUE TO CORONARY ARTERY OBSTRUCTION (HCC): ICD-10-CM

## 2025-01-24 DIAGNOSIS — I25.2 OLD MYOCARDIAL INFARCTION: ICD-10-CM

## 2025-01-24 DIAGNOSIS — E78.2 MIXED HYPERLIPIDEMIA: ICD-10-CM

## 2025-01-24 DIAGNOSIS — I25.10 ATHEROSCLEROSIS OF NATIVE CORONARY ARTERY OF NATIVE HEART WITHOUT ANGINA PECTORIS: ICD-10-CM

## 2025-01-24 DIAGNOSIS — I10 ESSENTIAL HYPERTENSION, BENIGN: ICD-10-CM

## 2025-01-24 NOTE — TELEPHONE ENCOUNTER
ADD ( TT)    Last saw [facility]    Caller: Levi Aguilar     Topic/issue: Patient will be seen with [facility] 5/8/25. He is requesting his annual blood work to be placed. Please notify patient when this has been completed .    Callback Number: [phone]    Thank you  Manisha RIVERO

## 2025-01-24 NOTE — TELEPHONE ENCOUNTER
ELLY Sinclair.  You28 minutes ago (12:53 PM)     Ok to order.    Thank you!     Lab orders placed.    Phone Number Called: [phone]    Call outcome: Spoke to patient regarding message below.    Message: Called to advise above and confirmed his FV with VR on 05/08/25. Fasting instructions provided. Also provided general number for lab outpatient services so he can schedule his lab visit. No other questions or concerns, pt was appreciative of call.

## 2025-04-14 ENCOUNTER — RESULTS FOLLOW-UP (OUTPATIENT)
Dept: CARDIOLOGY | Facility: MEDICAL CENTER | Age: 75
End: 2025-04-14
Payer: MEDICARE

## 2025-04-14 ENCOUNTER — HOSPITAL ENCOUNTER (OUTPATIENT)
Dept: LAB | Facility: MEDICAL CENTER | Age: 75
End: 2025-04-14
Payer: MEDICARE

## 2025-04-14 DIAGNOSIS — Z95.5 PRESENCE OF STENT IN LAD CORONARY ARTERY: ICD-10-CM

## 2025-04-14 DIAGNOSIS — I10 ESSENTIAL HYPERTENSION, BENIGN: ICD-10-CM

## 2025-04-14 DIAGNOSIS — E78.2 MIXED HYPERLIPIDEMIA: ICD-10-CM

## 2025-04-14 DIAGNOSIS — I25.10 ATHEROSCLEROSIS OF NATIVE CORONARY ARTERY OF NATIVE HEART WITHOUT ANGINA PECTORIS: ICD-10-CM

## 2025-04-14 DIAGNOSIS — I25.9 ISCHEMIC HEART DISEASE DUE TO CORONARY ARTERY OBSTRUCTION (HCC): ICD-10-CM

## 2025-04-14 DIAGNOSIS — I24.0 ISCHEMIC HEART DISEASE DUE TO CORONARY ARTERY OBSTRUCTION (HCC): ICD-10-CM

## 2025-04-14 LAB
ANION GAP SERPL CALC-SCNC: 11 MMOL/L (ref 7–16)
BUN SERPL-MCNC: 20 MG/DL (ref 8–22)
CALCIUM SERPL-MCNC: 9.9 MG/DL (ref 8.5–10.5)
CHLORIDE SERPL-SCNC: 105 MMOL/L (ref 96–112)
CHOLEST SERPL-MCNC: 131 MG/DL (ref 100–199)
CO2 SERPL-SCNC: 25 MMOL/L (ref 20–33)
CREAT SERPL-MCNC: 1.03 MG/DL (ref 0.5–1.4)
FASTING STATUS PATIENT QL REPORTED: NORMAL
GFR SERPLBLD CREATININE-BSD FMLA CKD-EPI: 76 ML/MIN/1.73 M 2
GLUCOSE SERPL-MCNC: 97 MG/DL (ref 65–99)
HDLC SERPL-MCNC: 51 MG/DL
LDLC SERPL CALC-MCNC: 66 MG/DL
POTASSIUM SERPL-SCNC: 5 MMOL/L (ref 3.6–5.5)
SODIUM SERPL-SCNC: 141 MMOL/L (ref 135–145)
TRIGL SERPL-MCNC: 69 MG/DL (ref 0–149)

## 2025-04-14 PROCEDURE — 80061 LIPID PANEL: CPT

## 2025-04-14 PROCEDURE — 80048 BASIC METABOLIC PNL TOTAL CA: CPT

## 2025-04-14 PROCEDURE — 36415 COLL VENOUS BLD VENIPUNCTURE: CPT

## 2025-04-14 NOTE — LETTER
April 15, 2025         Levi Aguilar  [address] [place] [address]          Dear Levi:    Below are the results from your recent visit:    Resulted Orders   Lipid Profile   Result Value Ref Range    Cholesterol,Tot 131 100 - 199 mg/dL    Triglycerides 69 0 - 149 mg/dL    HDL 51 >=40 mg/dL    LDL 66 <100 mg/dL   Basic Metabolic Panel   Result Value Ref Range    Sodium 141 135 - 145 mmol/L    Potassium 5.0 3.6 - 5.5 mmol/L    Chloride 105 96 - 112 mmol/L    Co2 25 20 - 33 mmol/L    Glucose 97 65 - 99 mg/dL    Bun 20 8 - 22 mg/dL    Creatinine 1.03 0.50 - 1.40 mg/dL    Calcium 9.9 8.5 - 10.5 mg/dL    Anion Gap 11.0 7.0 - 16.0   FASTING STATUS   Result Value Ref Range    Fasting Status Fasting    ESTIMATED GFR   Result Value Ref Range    GFR (CKD-EPI) 76 >60 mL/min/1.73 m 2      Comment:      Estimated Glomerular Filtration Rate is calculated using  race neutral CKD-EPI 2021 equation per NKF-ASN recommendations.               We have reviewed your recent lab results and are pleased to inform you that they are within acceptable limits. There are no recommended changes to your plan of care at this time. Please continue current medications and treatment recommendations. Please keep any currently scheduled follow up appointments.    Please let me know if you have any additional questions or concerns    Sincerely,        ELLY Sinclair.

## 2025-04-14 NOTE — LETTER
April 14, 2025        Levi Aguilar  [address] [place] [address]        Gerardo Sims,    We have reviewed your recent lab results and are pleased to inform you that they are within acceptable limits. There are no recommended changes to your plan of care at this time. Please continue current medications and treatment recommendations. Please remember to keep your scheduled follow up appointment 4/15/25, sooner if clinical condition changes.     Please let me know if you have any additional questions or concerns.     Thank you,  SENAIT Pablo  [facility]    [phone] phone  [phone] fax  Signed Electronically

## 2025-04-15 ENCOUNTER — OFFICE VISIT (OUTPATIENT)
Dept: CARDIOLOGY | Facility: MEDICAL CENTER | Age: 75
End: 2025-04-15
Attending: NURSE PRACTITIONER
Payer: MEDICARE

## 2025-04-15 VITALS
OXYGEN SATURATION: 96 % | BODY MASS INDEX: 28.77 KG/M2 | HEART RATE: 58 BPM | DIASTOLIC BLOOD PRESSURE: 78 MMHG | SYSTOLIC BLOOD PRESSURE: 110 MMHG | HEIGHT: 70 IN | RESPIRATION RATE: 16 BRPM | WEIGHT: 201 LBS

## 2025-04-15 DIAGNOSIS — I25.10 ATHEROSCLEROSIS OF NATIVE CORONARY ARTERY OF NATIVE HEART WITHOUT ANGINA PECTORIS: ICD-10-CM

## 2025-04-15 DIAGNOSIS — I10 ESSENTIAL HYPERTENSION, BENIGN: ICD-10-CM

## 2025-04-15 DIAGNOSIS — E78.2 MIXED HYPERLIPIDEMIA: ICD-10-CM

## 2025-04-15 DIAGNOSIS — Z95.5 PRESENCE OF STENT IN LAD CORONARY ARTERY: ICD-10-CM

## 2025-04-15 PROCEDURE — 99212 OFFICE O/P EST SF 10 MIN: CPT | Performed by: NURSE PRACTITIONER

## 2025-04-15 PROCEDURE — 99214 OFFICE O/P EST MOD 30 MIN: CPT | Performed by: NURSE PRACTITIONER

## 2025-04-15 PROCEDURE — 3078F DIAST BP <80 MM HG: CPT | Performed by: NURSE PRACTITIONER

## 2025-04-15 PROCEDURE — 3074F SYST BP LT 130 MM HG: CPT | Performed by: NURSE PRACTITIONER

## 2025-04-15 RX ORDER — EZETIMIBE 10 MG/1
TABLET ORAL
Qty: 100 TABLET | Refills: 3 | Status: SHIPPED | OUTPATIENT
Start: 2025-04-15

## 2025-04-15 RX ORDER — SPIRONOLACTONE AND HYDROCHLOROTHIAZIDE 25; 25 MG/1; MG/1
TABLET ORAL
Qty: 50 TABLET | Refills: 3 | Status: SHIPPED | OUTPATIENT
Start: 2025-04-15

## 2025-04-15 RX ORDER — ATORVASTATIN CALCIUM 80 MG/1
TABLET, FILM COATED ORAL
Qty: 100 TABLET | Refills: 3 | Status: SHIPPED | OUTPATIENT
Start: 2025-04-15

## 2025-04-15 RX ORDER — METOPROLOL SUCCINATE 25 MG/1
TABLET, EXTENDED RELEASE ORAL
Qty: 100 TABLET | Refills: 3 | Status: SHIPPED | OUTPATIENT
Start: 2025-04-15

## 2025-04-15 RX ORDER — LISINOPRIL 10 MG/1
TABLET ORAL
Qty: 100 TABLET | Refills: 3 | Status: SHIPPED | OUTPATIENT
Start: 2025-04-15

## 2025-04-15 ASSESSMENT — ENCOUNTER SYMPTOMS
PND: 0
FEVER: 0
CHILLS: 0
ORTHOPNEA: 0
HEADACHES: 0
PALPITATIONS: 0
MYALGIAS: 0
INSOMNIA: 0
COUGH: 0
ABDOMINAL PAIN: 0
NAUSEA: 0
DIZZINESS: 1
BRUISES/BLEEDS EASILY: 0
LOSS OF CONSCIOUSNESS: 0
SHORTNESS OF BREATH: 0

## 2025-04-15 NOTE — PROGRESS NOTES
"Chief Complaint   Patient presents with    Follow-Up    Coronary Artery Disease    HTN (Controlled)    Hyperlipidemia       Subjective     Levi Aguilar is a 74 y.o. male who presents today for annual follow-up of CAD, HTN and hyperlipidemia.    Levi is a 74 year old male with history of CAD, status post remote PCI/DIANA to the LAD in 2011 ([facility]), HTN, and hyperlipidemia, previously followed by Dr. Linares for many years, and last seen by Dr. Bernabe in April 2024.    He is here today for annual follow-up. He expresses some concerns about a news report of \"glass found in Atorvastatin.\"  I explain I am unaware of this news.     He denies any chest pain, pressure, tightness or discomfort; no palpitations; no shortness of breath, orthopnea or PND; occasional dizziness, but no syncope or presyncope. No LE edema. BP remains well controlled. He stays fairly active.    Past Medical History:   Diagnosis Date    Coronary atherosclerosis of native coronary artery     Hypertension     Ischemic cardiomyopathy     EF 45%    Mixed hyperlipidemia     Limited tolerance of statin therapy due to myalgias during primary prevention efforts prior to his infarct but now tolerant of full dose of atorvastatin    Presence of stent in LAD coronary artery     3/31/11 - 2.75 x 15 mm Promus in LAD, [facility], post lytic therapy of anterior MI.     Prior anterior myocardial infarction     3/31/11, Lytic therapy in [place] and PCI at the [facility].      Past Surgical History:   Procedure Laterality Date    ANGIOPLASTY  2011    LAD stenting    TONSILLECTOMY       Family History   Problem Relation Age of Onset    Heart Disease Mother     Heart Disease Father      Social History     Socioeconomic History    Marital status:      Spouse name: Not on file    Number of children: Not on file    Years of education: Not on file    Highest education level: Not on file   Occupational History    Not on file   Tobacco Use    Smoking status: " Former     Current packs/day: 0.00     Types: Cigarettes     Quit date: 1980     Years since quittin.3    Smokeless tobacco: Former     Types: Chew   Vaping Use    Vaping status: Never Used   Substance and Sexual Activity    Alcohol use: Yes     Alcohol/week: 8.4 oz     Types: 14 Standard drinks or equivalent per week    Drug use: No    Sexual activity: Not on file   Other Topics Concern    Not on file   Social History Narrative    Retired,      Social Drivers of Health     Financial Resource Strain: Not on file   Food Insecurity: Not on file   Transportation Needs: Not on file   Physical Activity: Not on file   Stress: Not on file   Social Connections: Not on file   Intimate Partner Violence: Not on file   Housing Stability: Not on file     No Known Allergies  Outpatient Encounter Medications as of 4/15/2025   Medication Sig Dispense Refill    atorvastatin (LIPITOR) 80 MG tablet TAKE ONE TABLET BY MOUTH AT BEDTIME 100 Tablet 3    ezetimibe (ZETIA) 10 MG Tab TAKE ONE TABLET BY MOUTH ONCE DAILY. 100 Tablet 3    lisinopril (PRINIVIL) 10 MG Tab TAKE ONE TABLET BY MOUTH ONCE DAILY 100 Tablet 3    metoprolol SR (TOPROL XL) 25 MG TABLET SR 24 HR TAKE ONE TABLET BY MOUTH ONCE DAILY 100 Tablet 3    spironolactone/hctz (ALDACTAZIDE) 25-25 MG Tab TAKE ONE-HALF TABLET BY MOUTH ONCE DAILY 50 Tablet 3    nitroglycerin (NITROSTAT) 0.4 MG SL Tab Place 1 Tablet under the tongue as needed for Chest Pain. 10 Tablet 3    aspirin 81 MG tablet Take 1 Tab by mouth every day. 90 Tab 3    Cholecalciferol (VITAMIN D PO) Take  by mouth.      MAGNESIUM CITRATE Take  by mouth every day.      Omega-3 Fatty Acids (FISH OIL PO) Take  by mouth. Take 2 capsules daily.       [DISCONTINUED] atorvastatin (LIPITOR) 80 MG tablet TAKE ONE TABLET BY MOUTH AT BEDTIME 100 Tablet 3    [DISCONTINUED] ezetimibe (ZETIA) 10 MG Tab TAKE ONE TABLET BY MOUTH ONCE DAILY. 100 Tablet 3    [DISCONTINUED] lisinopril (PRINIVIL) 10 MG Tab TAKE ONE TABLET BY  "MOUTH ONCE DAILY 100 Tablet 3    [DISCONTINUED] metoprolol SR (TOPROL XL) 25 MG TABLET SR 24 HR TAKE ONE TABLET BY MOUTH ONCE DAILY 100 Tablet 3    [DISCONTINUED] spironolactone/hctz (ALDACTAZIDE) 25-25 MG Tab TAKE ONE-HALF TABLET BY MOUTH ONCE DAILY 50 Tablet 3     No facility-administered encounter medications on file as of 4/15/2025.     Review of Systems   Constitutional:  Negative for chills and fever.   HENT:  Negative for congestion.    Respiratory:  Negative for cough and shortness of breath.    Cardiovascular:  Negative for chest pain, palpitations, orthopnea, leg swelling and PND.   Gastrointestinal:  Negative for abdominal pain and nausea.   Musculoskeletal:  Negative for myalgias.   Skin:  Negative for rash.   Neurological:  Positive for dizziness. Negative for loss of consciousness and headaches.   Endo/Heme/Allergies:  Does not bruise/bleed easily.   Psychiatric/Behavioral:  The patient does not have insomnia.               Objective     /78 (BP Location: Left arm, Patient Position: Sitting, BP Cuff Size: Adult)   Pulse (!) 58   Resp 16   Ht 1.778 m (5' 10\")   Wt 91.2 kg (201 lb)   SpO2 96%   BMI 28.84 kg/m²     Physical Exam  Constitutional:       Appearance: He is well-developed.   HENT:      Head: Normocephalic.   Neck:      Vascular: No JVD.   Cardiovascular:      Rate and Rhythm: Normal rate and regular rhythm.      Heart sounds: Normal heart sounds.   Pulmonary:      Effort: Pulmonary effort is normal. No respiratory distress.      Breath sounds: Normal breath sounds. No wheezing or rales.   Abdominal:      General: Bowel sounds are normal. There is no distension.      Palpations: Abdomen is soft.      Tenderness: There is no abdominal tenderness.   Musculoskeletal:         General: Normal range of motion.      Cervical back: Normal range of motion and neck supple.   Skin:     General: Skin is warm and dry.      Findings: No rash.   Neurological:      Mental Status: He is alert and " oriented to person, place, and time.   Psychiatric:         Mood and Affect: Mood normal.         Behavior: Behavior normal.       ECHOCARDIOGRAPHY:     CONCLUSIONS OF TTE OF 7/12/2012:  Moderately reduced left ventricular systolic function.   Ejection fraction  45-50%.   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   Grade I diastolic dysfunction is present.   Normal left atrial size    LABS:    Lab Results   Component Value Date/Time    CHOLSTRLTOT 131 04/14/2025 08:18 AM    LDL 66 04/14/2025 08:18 AM    HDL 51 04/14/2025 08:18 AM    TRIGLYCERIDE 69 04/14/2025 08:18 AM        Lab Results   Component Value Date/Time    SODIUM 141 04/14/2025 08:18 AM    POTASSIUM 5.0 04/14/2025 08:18 AM    CHLORIDE 105 04/14/2025 08:18 AM    CO2 25 04/14/2025 08:18 AM    GLUCOSE 97 04/14/2025 08:18 AM    BUN 20 04/14/2025 08:18 AM    CREATININE 1.03 04/14/2025 08:18 AM          Assessment & Plan     1. Atherosclerosis of native coronary artery of native heart without angina pectoris  EC-ECHOCARDIOGRAM COMPLETE W/O CONT      2. Presence of stent in LAD coronary artery  EC-ECHOCARDIOGRAM COMPLETE W/O CONT      3. Essential hypertension, benign  ezetimibe (ZETIA) 10 MG Tab    lisinopril (PRINIVIL) 10 MG Tab    metoprolol SR (TOPROL XL) 25 MG TABLET SR 24 HR    spironolactone/hctz (ALDACTAZIDE) 25-25 MG Tab      4. Mixed hyperlipidemia  atorvastatin (LIPITOR) 80 MG tablet          Medical Decision Making: Today's Assessment/Status/Plan:        CAD, status post remote PCI/DIANA to the LAD in 2011, stable. No angina, but occasional lightheadedness. Discussed repeating an echo, as last one was in 2012; he will consider this, and possibly schedule at [facility]. Continue:  ASA 81mg once daily  NTG 0.4mg SL PRN  Lipitor 80mg once daily  Zetia 10mg once daily  Lisinopril 10mg once daily  Toprol XL 25mg once daily  Aldactazide 25/25mg once daily    2.  Hypertension, treated with Toprol XL, Lisinopril and  Aldactazide, stable. BP is good today.    3.  Hyperlipidemia, treated with Lipitor 80mg and Zetia 10mg. Offered to switch him to Crestor 40mg, but he will stay with Lipitor for now. Last LDL was 66 (at goal).    Same medications, which are renewed x 1 year.  Ordered repeat echocardiogram, as LVEF in 2012 was 45%.  We reviewed recent labs.    Follow-up annually, sooner if clinical condition changes.